# Patient Record
Sex: MALE | Race: WHITE | NOT HISPANIC OR LATINO | Employment: FULL TIME | URBAN - METROPOLITAN AREA
[De-identification: names, ages, dates, MRNs, and addresses within clinical notes are randomized per-mention and may not be internally consistent; named-entity substitution may affect disease eponyms.]

---

## 2017-12-24 ENCOUNTER — OFFICE VISIT (OUTPATIENT)
Dept: URGENT CARE | Facility: CLINIC | Age: 52
End: 2017-12-24
Payer: COMMERCIAL

## 2017-12-24 VITALS
SYSTOLIC BLOOD PRESSURE: 160 MMHG | HEART RATE: 96 BPM | WEIGHT: 200 LBS | TEMPERATURE: 98.5 F | HEIGHT: 72 IN | RESPIRATION RATE: 14 BRPM | BODY MASS INDEX: 27.09 KG/M2 | DIASTOLIC BLOOD PRESSURE: 80 MMHG | OXYGEN SATURATION: 97 %

## 2017-12-24 DIAGNOSIS — J40 BRONCHITIS: Primary | ICD-10-CM

## 2017-12-24 DIAGNOSIS — J06.9 URI WITH COUGH AND CONGESTION: ICD-10-CM

## 2017-12-24 DIAGNOSIS — R68.89 FLU-LIKE SYMPTOMS: ICD-10-CM

## 2017-12-24 LAB
FLUAV+FLUBV AG SPEC QL IA: NEGATIVE
INT CON NEG: NEGATIVE
INT CON POS: POSITIVE

## 2017-12-24 PROCEDURE — 87804 INFLUENZA ASSAY W/OPTIC: CPT | Performed by: PHYSICIAN ASSISTANT

## 2017-12-24 PROCEDURE — 99204 OFFICE O/P NEW MOD 45 MIN: CPT | Performed by: PHYSICIAN ASSISTANT

## 2017-12-24 RX ORDER — DOXYCYCLINE HYCLATE 100 MG
100 TABLET ORAL 2 TIMES DAILY
Qty: 14 TAB | Refills: 0 | Status: SHIPPED | OUTPATIENT
Start: 2017-12-24 | End: 2017-12-31

## 2017-12-24 RX ORDER — METHYLPREDNISOLONE 4 MG/1
TABLET ORAL
Qty: 21 TAB | Refills: 0 | Status: SHIPPED | OUTPATIENT
Start: 2017-12-24 | End: 2019-07-03

## 2017-12-24 RX ORDER — PROMETHAZINE HYDROCHLORIDE AND CODEINE PHOSPHATE 6.25; 1 MG/5ML; MG/5ML
5 SYRUP ORAL 4 TIMES DAILY PRN
Qty: 240 ML | Refills: 0 | Status: SHIPPED | OUTPATIENT
Start: 2017-12-24 | End: 2018-01-07

## 2017-12-24 NOTE — PROGRESS NOTES
Subjective:      Pt is a 52 y.o. male who presents with URI (uri symptoms  , some diarrhea x 3 days .)            HPI  PT presents to UC clinic today complaining of sore throat, fevers, chills, watery eyes, pressure in ears, cough, fatigue, runny nose. PT denies CP, SOB, NVD, abdominal pain, joint pain. PT states these symptoms began around 3 days ago. PT states the pain is a 6/10, aching in nature and worse at night.  Pt has not taken any RX medications for this condition. The pt's medication list, problem list, and allergies have been evaluated and reviewed during today's visit.      PMH:  Negative per pt.      PSH:  Past Surgical History:   Procedure Laterality Date   • AORTIC VALVE REPLACEMENT  12/17/2008    Performed by PETE VALIENTE at SURGERY [facility] Hx:  the patient's family history is not pertinent to their current complaint    Soc HX:  Social History     Social History   • Marital status: Single     Spouse name: N/A   • Number of children: N/A   • Years of education: N/A     Occupational History   • Not on file.     Social History Main Topics   • Smoking status: Never Smoker   • Smokeless tobacco: Never Used   • Alcohol use Yes      Comment: little bit of alcohol   • Drug use: No   • Sexual activity: Not on file     Other Topics Concern   • Not on file     Social History Narrative   • No narrative on file         Medications:    Current Outpatient Prescriptions:   •  Multiple Vitamins-Minerals (ZINC PO), Take  by mouth., Disp: , Rfl:   •  Chlorphen-Pseudoephed-APAP (THERAFLU FLU/COLD PO), Take  by mouth., Disp: , Rfl:   •  WARFARIN SODIUM 2.5 MG PO TABS, Take  by mouth every evening., Disp: , Rfl:   •  VICODIN 5-500 MG PO TABS, Take  by mouth every 6 hours as needed., Disp: , Rfl:   •  LOPRESSOR PO, Take 12.5 mg by mouth 2 Times a Day., Disp: , Rfl:   •  AMOXICILLIN 500 MG PO CAPS, Take  by mouth 3 times a day., Disp: , Rfl:   •  STOOL SOFTENER PO, Take  by mouth., Disp: , Rfl:        Allergies:  Patient has no known allergies.    ROS    Constitutional: Positive for  malaise/fatigue.   HENT: Positive for congestion and sore throat. Negative for ear pain.    Eyes: Negative for blurred vision, double vision and photophobia.   Respiratory: Positive for cough and sputum production. Negative for hemoptysis, shortness of breath and wheezing.    Cardiovascular: Negative for chest pain and palpitations.   Gastrointestinal: Negative for nausea, vomiting, abdominal pain, diarrhea and constipation.   Genitourinary: Negative for dysuria and flank pain.   Musculoskeletal: Negative for falls and myalgias.   Skin: Negative for itching and rash.   Neurological: Positive for headaches. Negative for dizziness and tingling.   Endo/Heme/Allergies: Does not bruise/bleed easily.   Psychiatric/Behavioral: Negative for depression. The patient is not nervous/anxious.         Objective:     /80   Pulse 96   Temp 36.9 °C (98.5 °F)   Resp 14   Ht 1.829 m (6')   Wt 90.7 kg (200 lb)   SpO2 97%   BMI 27.12 kg/m²      Physical Exam    Physical Exam   Constitutional: PT is oriented to person, place, and time. PT appears well-developed and well-nourished. No distress.   HENT:   Head: Normocephalic and atraumatic.   Right Ear: Hearing, tympanic membrane, external ear and ear canal normal.   Left Ear: Hearing, tympanic membrane, external ear and ear canal normal.   Nose: Mucosal edema, rhinorrhea and sinus tenderness present. Right sinus exhibits frontal sinus tenderness. Left sinus exhibits frontal sinus tenderness.   Mouth/Throat: Uvula is midline. Mucous membranes are pale. Posterior oropharyngeal edema and posterior oropharyngeal erythema present. No oropharyngeal exudate.   Eyes: Conjunctivae normal and EOM are normal. Pupils are equal, round, and reactive to light. Right eye exhibits no discharge. Left eye exhibits no discharge.   Neck: Normal range of motion. Neck supple. No thyromegaly present.    Cardiovascular: Normal rate, regular rhythm, normal heart sounds and intact distal pulses.  Exam reveals no gallop and no friction rub.    No murmur heard.  Pulmonary/Chest: Effort normal. No respiratory distress. PT has wheezes. PT has no rales. PT exhibits tenderness.   Abdominal: Soft. Bowel sounds are normal. PT exhibits no distension and no mass. There is no tenderness. There is no rebound and no guarding.   Musculoskeletal: Normal range of motion. PT exhibits no edema and no tenderness.   Lymphadenopathy:     PT has no cervical adenopathy.   Neurological: Pt is alert and oriented to person, place, and time. Pt has normal reflexes. No cranial nerve deficit.   Skin: Skin is warm and dry. No rash noted. No erythema.   Psychiatric: PT has a normal mood and affect. Pt behavior is normal. Judgment and thought content normal.          Assessment/Plan:     1. Bronchitis    2. URI with cough and congestion      2. Flu-like symptoms    POC flu-->NEG  Doxycycline  Codeine cough syrup  Medrol pack  Rest, fluids encouraged.  OTC decongestant for congestion/cough  AVS with medical info given.  Pt was in full understanding and agreement with the plan.  Follow-up as needed if symptoms worsen or fail to improve.

## 2017-12-24 NOTE — PATIENT INSTRUCTIONS
Acute Bronchitis  Bronchitis is inflammation of the airways that extend from the windpipe into the lungs (bronchi). The inflammation often causes mucus to develop. This leads to a cough, which is the most common symptom of bronchitis.   In acute bronchitis, the condition usually develops suddenly and goes away over time, usually in a couple weeks. Smoking, allergies, and asthma can make bronchitis worse. Repeated episodes of bronchitis may cause further lung problems.   CAUSES  Acute bronchitis is most often caused by the same virus that causes a cold. The virus can spread from person to person (contagious) through coughing, sneezing, and touching contaminated objects.  SIGNS AND SYMPTOMS   · Cough.    · Fever.    · Coughing up mucus.    · Body aches.    · Chest congestion.    · Chills.    · Shortness of breath.    · Sore throat.    DIAGNOSIS   Acute bronchitis is usually diagnosed through a physical exam. Your health care provider will also ask you questions about your medical history. Tests, such as chest X-rays, are sometimes done to rule out other conditions.   TREATMENT   Acute bronchitis usually goes away in a couple weeks. Oftentimes, no medical treatment is necessary. Medicines are sometimes given for relief of fever or cough. Antibiotic medicines are usually not needed but may be prescribed in certain situations. In some cases, an inhaler may be recommended to help reduce shortness of breath and control the cough. A cool mist vaporizer may also be used to help thin bronchial secretions and make it easier to clear the chest.   HOME CARE INSTRUCTIONS  · Get plenty of rest.    · Drink enough fluids to keep your urine clear or pale yellow (unless you have a medical condition that requires fluid restriction). Increasing fluids may help thin your respiratory secretions (sputum) and reduce chest congestion, and it will prevent dehydration.    · Take medicines only as directed by your health care provider.  · If  you were prescribed an antibiotic medicine, finish it all even if you start to feel better.  · Avoid smoking and secondhand smoke. Exposure to cigarette smoke or irritating chemicals will make bronchitis worse. If you are a smoker, consider using nicotine gum or skin patches to help control withdrawal symptoms. Quitting smoking will help your lungs heal faster.    · Reduce the chances of another bout of acute bronchitis by washing your hands frequently, avoiding people with cold symptoms, and trying not to touch your hands to your mouth, nose, or eyes.    · Keep all follow-up visits as directed by your health care provider.    SEEK MEDICAL CARE IF:  Your symptoms do not improve after 1 week of treatment.   SEEK IMMEDIATE MEDICAL CARE IF:  · You develop an increased fever or chills.    · You have chest pain.    · You have severe shortness of breath.  · You have bloody sputum.    · You develop dehydration.  · You faint or repeatedly feel like you are going to pass out.  · You develop repeated vomiting.  · You develop a severe headache.  MAKE SURE YOU:   · Understand these instructions.  · Will watch your condition.  · Will get help right away if you are not doing well or get worse.     This information is not intended to replace advice given to you by your health care provider. Make sure you discuss any questions you have with your health care provider.     Document Released: 01/25/2006 Document Revised: 01/08/2016 Document Reviewed: 06/10/2014  [employer] Interactive Patient Education ©2016 [employer]

## 2019-07-03 ENCOUNTER — OFFICE VISIT (OUTPATIENT)
Dept: URGENT CARE | Facility: CLINIC | Age: 54
End: 2019-07-03
Payer: COMMERCIAL

## 2019-07-03 VITALS
OXYGEN SATURATION: 98 % | DIASTOLIC BLOOD PRESSURE: 74 MMHG | RESPIRATION RATE: 15 BRPM | HEART RATE: 69 BPM | HEIGHT: 72 IN | SYSTOLIC BLOOD PRESSURE: 116 MMHG | BODY MASS INDEX: 27.09 KG/M2 | TEMPERATURE: 97.1 F | WEIGHT: 200 LBS

## 2019-07-03 DIAGNOSIS — L08.9 SKIN INFECTION: ICD-10-CM

## 2019-07-03 PROCEDURE — 99214 OFFICE O/P EST MOD 30 MIN: CPT | Performed by: FAMILY MEDICINE

## 2019-07-03 RX ORDER — DOXYCYCLINE HYCLATE 100 MG
100 TABLET ORAL EVERY 12 HOURS
Qty: 14 TAB | Refills: 0 | Status: SHIPPED | OUTPATIENT
Start: 2019-07-03 | End: 2019-07-10

## 2019-07-04 NOTE — PROGRESS NOTES
Subjective:      Kiran Raymond is a 54 y.o. male who presents with Hand Problem (hand infection from cut )      - This is a pleasant and non toxic appearing 54 y.o. male with c/o moving something yesterday and cut top of Rt hand on a piece of metal. Last td 3 yrs ago. Wounds looks red today to him           ALLERGIES:  Patient has no known allergies.     PMH:  History reviewed. No pertinent past medical history.     PSH:  Past Surgical History:   Procedure Laterality Date   • AORTIC VALVE REPLACEMENT  12/17/2008    Performed by PETE VALIENTE at [facility]       MEDS:    Current Outpatient Prescriptions:   •  doxycycline (VIBRAMYCIN) 100 MG Tab, Take 1 Tab by mouth every 12 hours for 7 days., Disp: 14 Tab, Rfl: 0  •  mupirocin (BACTROBAN) 2 % Ointment, Apply 1 Application to affected area(s) 2 times a day for 7 days., Disp: 30 g, Rfl: 1  •  Multiple Vitamins-Minerals (ZINC PO), Take  by mouth., Disp: , Rfl:   •  VICODIN 5-500 MG PO TABS, Take  by mouth every 6 hours as needed., Disp: , Rfl:   •  LOPRESSOR PO, Take 12.5 mg by mouth 2 Times a Day., Disp: , Rfl:   •  WARFARIN SODIUM 2.5 MG PO TABS, Take  by mouth every evening., Disp: , Rfl:     ** I have documented what I find to be significant in regards to past medical, social, family and surgical history  in my HPI or under PMH/PSH/FH review section, otherwise it is contributory **         HPI    Review of Systems   Skin:        Cut Rt hand   All other systems reviewed and are negative.         Objective:     /74   Pulse 69   Temp 36.2 °C (97.1 °F) (Temporal)   Resp 15   Ht 1.829 m (6')   Wt 90.7 kg (200 lb)   SpO2 98%   BMI 27.12 kg/m²      Physical Exam   Constitutional: He appears well-developed. No distress.   HENT:   Head: Normocephalic and atraumatic.   Cardiovascular: Regular rhythm.    No murmur heard.  Pulmonary/Chest: Effort normal. No respiratory distress.   Neurological: He is alert. He exhibits normal muscle tone.   Skin:  Skin is warm and dry.   Psychiatric: He has a normal mood and affect. Judgment normal.   Nursing note and vitals reviewed.    ~1-2cm non bleeding/healing wound dorsal Rt hand w/ slight erythema around wound margins. Full srom fingers           Assessment/Plan:         1. Skin infection  doxycycline (VIBRAMYCIN) 100 MG Tab    mupirocin (BACTROBAN) 2 % Ointment         Check INR in 3-4 days     Dx & d/c instructions discussed w/ patient and/or family members.     Follow up with PCP (or here if PCP unavailable) in 2-3 days if symptoms not improving, ER if feeling/getting worse.    Any realistic and/or common medication side effects that may have been given today(i.e. Rash, GI upset/constipation, sedation, elevation of BP or blood sugars) reviewed.     Patient left in stable condition

## 2019-07-04 NOTE — PROGRESS NOTES
Subjective:      Kiran Raymond is a 54 y.o. male who presents with Hand Problem (hand infection from cut )      - This is a pleasant and non toxic appearing 54 y.o. male with c/o           ALLERGIES:  Patient has no known allergies.     PMH:  History reviewed. No pertinent past medical history.     PSH:  Past Surgical History:   Procedure Laterality Date   • AORTIC VALVE REPLACEMENT  12/17/2008    Performed by PETE VALIENTE at [facility]       MEDS:    Current Outpatient Prescriptions:   •  Multiple Vitamins-Minerals (ZINC PO), Take  by mouth., Disp: , Rfl:   •  VICODIN 5-500 MG PO TABS, Take  by mouth every 6 hours as needed., Disp: , Rfl:   •  LOPRESSOR PO, Take 12.5 mg by mouth 2 Times a Day., Disp: , Rfl:   •  WARFARIN SODIUM 2.5 MG PO TABS, Take  by mouth every evening., Disp: , Rfl:     ** I have documented what I find to be significant in regards to past medical, social, family and surgical history  in my HPI or under PMH/PSH/FH review section, otherwise it is contributory **         HPI    Review of Systems   All other systems reviewed and are negative.         Objective:     /74   Pulse 69   Temp 36.2 °C (97.1 °F) (Temporal)   Resp 15   Ht 1.829 m (6')   Wt 90.7 kg (200 lb)   SpO2 98%   BMI 27.12 kg/m²      Physical Exam   Constitutional: He appears well-developed. No distress.   HENT:   Head: Normocephalic and atraumatic.   Mouth/Throat: Oropharynx is clear and moist.   Eyes: Conjunctivae are normal.   Neck: Neck supple.   Cardiovascular: Regular rhythm.    No murmur heard.  Pulmonary/Chest: Effort normal. No respiratory distress.   Neurological: He is alert. He exhibits normal muscle tone.   Skin: Skin is warm and dry.   Psychiatric: He has a normal mood and affect. Judgment normal.   Nursing note and vitals reviewed.              Assessment/Plan:         1. Skin infection               Dx & d/c instructions discussed w/ patient and/or family members.     Follow up with PCP  (or here if PCP unavailable) in 2-3 days if symptoms not improving, ER if feeling/getting worse.    Any realistic and/or common medication side effects that may have been given today(i.e. Rash, GI upset/constipation, sedation, elevation of BP or blood sugars) reviewed.     Patient left in stable condition

## 2021-12-15 ENCOUNTER — APPOINTMENT (RX ONLY)
Dept: URBAN - METROPOLITAN AREA CLINIC 6 | Facility: CLINIC | Age: 56
Setting detail: DERMATOLOGY
End: 2021-12-15

## 2021-12-15 DIAGNOSIS — L82.1 OTHER SEBORRHEIC KERATOSIS: ICD-10-CM

## 2021-12-15 DIAGNOSIS — D22 MELANOCYTIC NEVI: ICD-10-CM

## 2021-12-15 DIAGNOSIS — Z71.89 OTHER SPECIFIED COUNSELING: ICD-10-CM

## 2021-12-15 DIAGNOSIS — D18.0 HEMANGIOMA: ICD-10-CM

## 2021-12-15 DIAGNOSIS — L57.0 ACTINIC KERATOSIS: ICD-10-CM

## 2021-12-15 DIAGNOSIS — L85.3 XEROSIS CUTIS: ICD-10-CM

## 2021-12-15 PROBLEM — D22.39 MELANOCYTIC NEVI OF OTHER PARTS OF FACE: Status: ACTIVE | Noted: 2021-12-15

## 2021-12-15 PROBLEM — D18.01 HEMANGIOMA OF SKIN AND SUBCUTANEOUS TISSUE: Status: ACTIVE | Noted: 2021-12-15

## 2021-12-15 PROCEDURE — ? SUNSCREEN RECOMMENDATIONS

## 2021-12-15 PROCEDURE — ? COUNSELING

## 2021-12-15 PROCEDURE — ? LIQUID NITROGEN

## 2021-12-15 PROCEDURE — ? ADDITIONAL NOTES

## 2021-12-15 PROCEDURE — 99203 OFFICE O/P NEW LOW 30 MIN: CPT | Mod: 25

## 2021-12-15 PROCEDURE — 17003 DESTRUCT PREMALG LES 2-14: CPT

## 2021-12-15 PROCEDURE — 17000 DESTRUCT PREMALG LESION: CPT

## 2021-12-15 ASSESSMENT — LOCATION SIMPLE DESCRIPTION DERM
LOCATION SIMPLE: RIGHT FOREHEAD
LOCATION SIMPLE: LEFT FOREHEAD
LOCATION SIMPLE: RIGHT FOREARM
LOCATION SIMPLE: RIGHT UPPER ARM
LOCATION SIMPLE: LEFT UPPER ARM
LOCATION SIMPLE: LEFT FOREARM
LOCATION SIMPLE: LEFT CHEEK
LOCATION SIMPLE: CHIN
LOCATION SIMPLE: INFERIOR FOREHEAD

## 2021-12-15 ASSESSMENT — LOCATION DETAILED DESCRIPTION DERM
LOCATION DETAILED: RIGHT VENTRAL DISTAL FOREARM
LOCATION DETAILED: LEFT INFERIOR LATERAL MALAR CHEEK
LOCATION DETAILED: RIGHT PROXIMAL POSTERIOR UPPER ARM
LOCATION DETAILED: INFERIOR MID FOREHEAD
LOCATION DETAILED: LEFT PROXIMAL POSTERIOR UPPER ARM
LOCATION DETAILED: LEFT CHIN
LOCATION DETAILED: LEFT VENTRAL DISTAL FOREARM
LOCATION DETAILED: RIGHT INFERIOR LATERAL FOREHEAD
LOCATION DETAILED: LEFT INFERIOR FOREHEAD

## 2021-12-15 ASSESSMENT — LOCATION ZONE DERM
LOCATION ZONE: ARM
LOCATION ZONE: FACE

## 2021-12-15 NOTE — PROCEDURE: LIQUID NITROGEN
Render Note In Bullet Format When Appropriate: No
Post-Care Instructions: I reviewed with the patient in detail post-care instructions. Patient is to wear sunprotection, and avoid picking at any of the treated lesions. Pt may apply Vaseline to crusted or scabbing areas.
Detail Level: Detailed
Duration Of Freeze Thaw-Cycle (Seconds): 8
Show Aperture Variable?: Yes
Number Of Freeze-Thaw Cycles: 2 freeze-thaw cycles
Consent: The patient's consent was obtained including but not limited to risks of crusting, scabbing, blistering, scarring, darker or lighter pigmentary change, recurrence, incomplete removal and infection.

## 2021-12-15 NOTE — PROCEDURE: SUNSCREEN RECOMMENDATIONS
Detail Level: Detailed
Products Recommended: Elta MD UV Clear and Brush on Block
General Sunscreen Counseling: I recommended a broad spectrum sunscreen with a SPF of 30 or higher.  I explained that SPF 30 sunscreens block approximately 97 percent of the sun's harmful rays.  Sunscreens should be applied at least 15 minutes prior to expected sun exposure and then every 2 hours after that as long as sun exposure continues. If swimming or exercising sunscreen should be reapplied every 45 minutes to an hour after getting wet or sweating.  One ounce, or the equivalent of a shot glass full of sunscreen, is adequate to protect the skin not covered by a bathing suit. I also recommended a lip balm with a sunscreen as well. Sun protective clothing can be used in lieu of sunscreen but must be worn the entire time you are exposed to the sun's rays.

## 2021-12-15 NOTE — PROCEDURE: ADDITIONAL NOTES
Detail Level: Detailed
Additional Notes: Lesion of concern located above the right eyebrow is clinically consistent with an Actinic Keratosis.
Render Risk Assessment In Note?: no
Additional Notes: Recommended use of CeraVe Moisturizing Cream 1-2 times daily, with one application within 3-5 minutes after showering.

## 2022-02-17 ENCOUNTER — APPOINTMENT (RX ONLY)
Dept: URBAN - METROPOLITAN AREA CLINIC 6 | Facility: CLINIC | Age: 57
Setting detail: DERMATOLOGY
End: 2022-02-17

## 2022-02-17 DIAGNOSIS — L91.8 OTHER HYPERTROPHIC DISORDERS OF THE SKIN: ICD-10-CM

## 2022-02-17 DIAGNOSIS — L85.3 XEROSIS CUTIS: ICD-10-CM

## 2022-02-17 DIAGNOSIS — D18.0 HEMANGIOMA: ICD-10-CM

## 2022-02-17 DIAGNOSIS — L81.4 OTHER MELANIN HYPERPIGMENTATION: ICD-10-CM

## 2022-02-17 DIAGNOSIS — D22 MELANOCYTIC NEVI: ICD-10-CM

## 2022-02-17 DIAGNOSIS — L82.1 OTHER SEBORRHEIC KERATOSIS: ICD-10-CM

## 2022-02-17 DIAGNOSIS — L57.0 ACTINIC KERATOSIS: ICD-10-CM

## 2022-02-17 DIAGNOSIS — Z71.89 OTHER SPECIFIED COUNSELING: ICD-10-CM

## 2022-02-17 PROBLEM — D22.5 MELANOCYTIC NEVI OF TRUNK: Status: ACTIVE | Noted: 2022-02-17

## 2022-02-17 PROBLEM — D18.01 HEMANGIOMA OF SKIN AND SUBCUTANEOUS TISSUE: Status: ACTIVE | Noted: 2022-02-17

## 2022-02-17 PROCEDURE — ? ADDITIONAL NOTES

## 2022-02-17 PROCEDURE — ? PHOTO-DOCUMENTATION

## 2022-02-17 PROCEDURE — 17000 DESTRUCT PREMALG LESION: CPT

## 2022-02-17 PROCEDURE — 99213 OFFICE O/P EST LOW 20 MIN: CPT | Mod: 25

## 2022-02-17 PROCEDURE — ? COUNSELING

## 2022-02-17 PROCEDURE — ? LIQUID NITROGEN

## 2022-02-17 PROCEDURE — ? SUNSCREEN RECOMMENDATIONS

## 2022-02-17 PROCEDURE — 17003 DESTRUCT PREMALG LES 2-14: CPT

## 2022-02-17 ASSESSMENT — LOCATION DETAILED DESCRIPTION DERM
LOCATION DETAILED: LEFT PROXIMAL POSTERIOR UPPER ARM
LOCATION DETAILED: RIGHT SUPERIOR MEDIAL MIDBACK
LOCATION DETAILED: PERIUMBILICAL SKIN
LOCATION DETAILED: RIGHT ANTERIOR PROXIMAL THIGH
LOCATION DETAILED: RIGHT POSTERIOR SHOULDER
LOCATION DETAILED: LEFT MEDIAL INFERIOR CHEST
LOCATION DETAILED: LEFT PROXIMAL PRETIBIAL REGION
LOCATION DETAILED: RIGHT CENTRAL EYEBROW
LOCATION DETAILED: RIGHT INFERIOR MEDIAL UPPER BACK
LOCATION DETAILED: MID POSTERIOR NECK
LOCATION DETAILED: LEFT CENTRAL MALAR CHEEK
LOCATION DETAILED: LEFT POSTERIOR SHOULDER
LOCATION DETAILED: RIGHT DISTAL CALF
LOCATION DETAILED: EPIGASTRIC SKIN
LOCATION DETAILED: RIGHT PROXIMAL POSTERIOR UPPER ARM

## 2022-02-17 ASSESSMENT — LOCATION SIMPLE DESCRIPTION DERM
LOCATION SIMPLE: RIGHT LOWER BACK
LOCATION SIMPLE: LEFT UPPER ARM
LOCATION SIMPLE: RIGHT UPPER BACK
LOCATION SIMPLE: RIGHT THIGH
LOCATION SIMPLE: RIGHT UPPER ARM
LOCATION SIMPLE: POSTERIOR NECK
LOCATION SIMPLE: LEFT SHOULDER
LOCATION SIMPLE: ABDOMEN
LOCATION SIMPLE: CHEST
LOCATION SIMPLE: RIGHT EYEBROW
LOCATION SIMPLE: RIGHT CALF
LOCATION SIMPLE: LEFT PRETIBIAL REGION
LOCATION SIMPLE: RIGHT SHOULDER
LOCATION SIMPLE: LEFT CHEEK

## 2022-02-17 ASSESSMENT — LOCATION ZONE DERM
LOCATION ZONE: FACE
LOCATION ZONE: TRUNK
LOCATION ZONE: ARM
LOCATION ZONE: NECK
LOCATION ZONE: LEG

## 2022-02-17 NOTE — PROCEDURE: COUNSELING
Detail Level: Generalized
Detail Level: Zone
Sunscreen Recommendations: Recommended broad spectrum inorganic or physical sunscreens primarily containing zinc oxide or titanium dioxide.
Detail Level: Detailed

## 2022-02-17 NOTE — PROCEDURE: ADDITIONAL NOTES
Render Risk Assessment In Note?: no
Detail Level: Detailed
Additional Notes: Recommended use of CeraVe Moisturizing Cream 1-2 times daily, with one application within 3-5 minutes after showering.

## 2022-02-17 NOTE — PROCEDURE: LIQUID NITROGEN
Detail Level: Detailed
Show Applicator Variable?: Yes
Duration Of Freeze Thaw-Cycle (Seconds): 8
Post-Care Instructions: I reviewed with the patient in detail post-care instructions. Patient is to wear sunprotection, and avoid picking at any of the treated lesions. Pt may apply Vaseline to crusted or scabbing areas.
Render Note In Bullet Format When Appropriate: No
Number Of Freeze-Thaw Cycles: 2 freeze-thaw cycles
Consent: The patient's consent was obtained including but not limited to risks of crusting, scabbing, blistering, scarring, darker or lighter pigmentary change, recurrence, incomplete removal and infection.

## 2022-02-17 NOTE — PROCEDURE: PHOTO-DOCUMENTATION
Photo Preface (Leave Blank If You Do Not Want): Photographs were obtained today
Details (Free Text): Left cheek. Appears most consistent with an irritated nevus, no concerning features clinically or under dermoscopy. Will continue to monitor and will recheck at f/u visit.
Detail Level: Detailed

## 2023-05-12 ENCOUNTER — HOSPITAL ENCOUNTER (OUTPATIENT)
Dept: LAB | Facility: MEDICAL CENTER | Age: 58
End: 2023-05-12
Attending: FAMILY MEDICINE
Payer: COMMERCIAL

## 2023-05-12 LAB
ALBUMIN SERPL BCP-MCNC: 4.4 G/DL (ref 3.2–4.9)
ALBUMIN/GLOB SERPL: 1.6 G/DL
ALP SERPL-CCNC: 69 U/L (ref 30–99)
ALT SERPL-CCNC: 16 U/L (ref 2–50)
ANION GAP SERPL CALC-SCNC: 12 MMOL/L (ref 7–16)
AST SERPL-CCNC: 25 U/L (ref 12–45)
BASOPHILS # BLD AUTO: 0.8 % (ref 0–1.8)
BASOPHILS # BLD: 0.04 K/UL (ref 0–0.12)
BILIRUB SERPL-MCNC: 1 MG/DL (ref 0.1–1.5)
BUN SERPL-MCNC: 16 MG/DL (ref 8–22)
CALCIUM ALBUM COR SERPL-MCNC: 8.4 MG/DL (ref 8.5–10.5)
CALCIUM SERPL-MCNC: 8.7 MG/DL (ref 8.5–10.5)
CHLORIDE SERPL-SCNC: 105 MMOL/L (ref 96–112)
CHOLEST SERPL-MCNC: 184 MG/DL (ref 100–199)
CO2 SERPL-SCNC: 23 MMOL/L (ref 20–33)
CREAT SERPL-MCNC: 0.82 MG/DL (ref 0.5–1.4)
EOSINOPHIL # BLD AUTO: 0.1 K/UL (ref 0–0.51)
EOSINOPHIL NFR BLD: 1.9 % (ref 0–6.9)
ERYTHROCYTE [DISTWIDTH] IN BLOOD BY AUTOMATED COUNT: 45 FL (ref 35.9–50)
FASTING STATUS PATIENT QL REPORTED: NORMAL
GFR SERPLBLD CREATININE-BSD FMLA CKD-EPI: 101 ML/MIN/1.73 M 2
GLOBULIN SER CALC-MCNC: 2.8 G/DL (ref 1.9–3.5)
GLUCOSE SERPL-MCNC: 97 MG/DL (ref 65–99)
HCT VFR BLD AUTO: 47.5 % (ref 42–52)
HDLC SERPL-MCNC: 51 MG/DL
HGB BLD-MCNC: 16.7 G/DL (ref 14–18)
IMM GRANULOCYTES # BLD AUTO: 0.01 K/UL (ref 0–0.11)
IMM GRANULOCYTES NFR BLD AUTO: 0.2 % (ref 0–0.9)
INR PPP: 2.88 (ref 0.87–1.13)
LDLC SERPL CALC-MCNC: 107 MG/DL
LYMPHOCYTES # BLD AUTO: 0.95 K/UL (ref 1–4.8)
LYMPHOCYTES NFR BLD: 18.1 % (ref 22–41)
MCH RBC QN AUTO: 31.9 PG (ref 27–33)
MCHC RBC AUTO-ENTMCNC: 35.2 G/DL (ref 33.7–35.3)
MCV RBC AUTO: 90.8 FL (ref 81.4–97.8)
MONOCYTES # BLD AUTO: 0.32 K/UL (ref 0–0.85)
MONOCYTES NFR BLD AUTO: 6.1 % (ref 0–13.4)
NEUTROPHILS # BLD AUTO: 3.82 K/UL (ref 1.82–7.42)
NEUTROPHILS NFR BLD: 72.9 % (ref 44–72)
NRBC # BLD AUTO: 0 K/UL
NRBC BLD-RTO: 0 /100 WBC
PLATELET # BLD AUTO: 167 K/UL (ref 164–446)
PMV BLD AUTO: 12.1 FL (ref 9–12.9)
POTASSIUM SERPL-SCNC: 4.2 MMOL/L (ref 3.6–5.5)
PROT SERPL-MCNC: 7.2 G/DL (ref 6–8.2)
PROTHROMBIN TIME: 29.2 SEC (ref 12–14.6)
PSA SERPL-MCNC: 1.17 NG/ML (ref 0–4)
RBC # BLD AUTO: 5.23 M/UL (ref 4.7–6.1)
SODIUM SERPL-SCNC: 140 MMOL/L (ref 135–145)
TRIGL SERPL-MCNC: 129 MG/DL (ref 0–149)
WBC # BLD AUTO: 5.2 K/UL (ref 4.8–10.8)

## 2023-05-12 PROCEDURE — 85610 PROTHROMBIN TIME: CPT

## 2023-05-12 PROCEDURE — 84403 ASSAY OF TOTAL TESTOSTERONE: CPT

## 2023-05-12 PROCEDURE — 80053 COMPREHEN METABOLIC PANEL: CPT

## 2023-05-12 PROCEDURE — 85025 COMPLETE CBC W/AUTO DIFF WBC: CPT

## 2023-05-12 PROCEDURE — 80061 LIPID PANEL: CPT

## 2023-05-12 PROCEDURE — 84270 ASSAY OF SEX HORMONE GLOBUL: CPT

## 2023-05-12 PROCEDURE — 36415 COLL VENOUS BLD VENIPUNCTURE: CPT

## 2023-05-12 PROCEDURE — 84153 ASSAY OF PSA TOTAL: CPT

## 2023-05-12 PROCEDURE — 84402 ASSAY OF FREE TESTOSTERONE: CPT

## 2023-05-15 LAB
SHBG SERPL-SCNC: 34 NMOL/L (ref 19–76)
TESTOST FREE MFR SERPL: 1.8 % (ref 1.6–2.9)
TESTOST FREE SERPL-MCNC: 61 PG/ML (ref 47–244)
TESTOST SERPL-MCNC: 341 NG/DL (ref 300–890)

## 2023-12-14 ENCOUNTER — APPOINTMENT (RX ONLY)
Dept: URBAN - METROPOLITAN AREA CLINIC 6 | Facility: CLINIC | Age: 58
Setting detail: DERMATOLOGY
End: 2023-12-14

## 2023-12-14 DIAGNOSIS — L57.0 ACTINIC KERATOSIS: ICD-10-CM

## 2023-12-14 DIAGNOSIS — D18.0 HEMANGIOMA: ICD-10-CM

## 2023-12-14 DIAGNOSIS — L81.8 OTHER SPECIFIED DISORDERS OF PIGMENTATION: ICD-10-CM

## 2023-12-14 DIAGNOSIS — Z71.89 OTHER SPECIFIED COUNSELING: ICD-10-CM

## 2023-12-14 DIAGNOSIS — L81.4 OTHER MELANIN HYPERPIGMENTATION: ICD-10-CM

## 2023-12-14 DIAGNOSIS — L82.1 OTHER SEBORRHEIC KERATOSIS: ICD-10-CM

## 2023-12-14 DIAGNOSIS — D22 MELANOCYTIC NEVI: ICD-10-CM

## 2023-12-14 PROBLEM — D18.01 HEMANGIOMA OF SKIN AND SUBCUTANEOUS TISSUE: Status: ACTIVE | Noted: 2023-12-14

## 2023-12-14 PROBLEM — D22.5 MELANOCYTIC NEVI OF TRUNK: Status: ACTIVE | Noted: 2023-12-14

## 2023-12-14 PROCEDURE — ? SUNSCREEN RECOMMENDATIONS

## 2023-12-14 PROCEDURE — ? COUNSELING

## 2023-12-14 PROCEDURE — 17000 DESTRUCT PREMALG LESION: CPT

## 2023-12-14 PROCEDURE — 17003 DESTRUCT PREMALG LES 2-14: CPT

## 2023-12-14 PROCEDURE — 99213 OFFICE O/P EST LOW 20 MIN: CPT | Mod: 25

## 2023-12-14 PROCEDURE — ? PHOTO-DOCUMENTATION

## 2023-12-14 PROCEDURE — ? LIQUID NITROGEN

## 2023-12-14 ASSESSMENT — LOCATION DETAILED DESCRIPTION DERM
LOCATION DETAILED: LEFT MID-UPPER BACK
LOCATION DETAILED: RIGHT CENTRAL EYEBROW
LOCATION DETAILED: LEFT INFERIOR UPPER BACK
LOCATION DETAILED: RIGHT SUPERIOR PREAURICULAR CHEEK
LOCATION DETAILED: LEFT RIB CAGE
LOCATION DETAILED: LEFT INFERIOR CENTRAL MALAR CHEEK
LOCATION DETAILED: LEFT ANTERIOR PROXIMAL THIGH

## 2023-12-14 ASSESSMENT — LOCATION ZONE DERM
LOCATION ZONE: LEG
LOCATION ZONE: TRUNK
LOCATION ZONE: FACE

## 2023-12-14 ASSESSMENT — LOCATION SIMPLE DESCRIPTION DERM
LOCATION SIMPLE: RIGHT EYEBROW
LOCATION SIMPLE: RIGHT CHEEK
LOCATION SIMPLE: LEFT UPPER BACK
LOCATION SIMPLE: LEFT THIGH
LOCATION SIMPLE: ABDOMEN
LOCATION SIMPLE: LEFT CHEEK

## 2023-12-14 NOTE — PROCEDURE: LIQUID NITROGEN
Show Aperture Variable?: Yes
Render Note In Bullet Format When Appropriate: No
Number Of Freeze-Thaw Cycles: 2 freeze-thaw cycles
Post-Care Instructions: I reviewed with the patient in detail post-care instructions. Patient is to wear sunprotection, and avoid picking at any of the treated lesions. Pt may apply Vaseline to crusted or scabbing areas.
Duration Of Freeze Thaw-Cycle (Seconds): 8
Consent: The patient's consent was obtained including but not limited to risks of crusting, scabbing, blistering, scarring, darker or lighter pigmentary change, recurrence, incomplete removal and infection.
Detail Level: Detailed

## 2023-12-14 NOTE — PROCEDURE: PHOTO-DOCUMENTATION
Photo Preface (Leave Blank If You Do Not Want): Photographs were obtained today
Details (Free Text): Captured updated clinical photo of lesion on left cheek and right chest. Nevus involving cheek appears either significantly irritated due to shaving, or it’s changing (concerning features). Patient reports long-standing duration and denies any changes, defers biopsy today. Will have low threshold for biopsy.  Will recheck at 3 month follow up visit.
Detail Level: Detailed

## 2024-03-14 ENCOUNTER — APPOINTMENT (RX ONLY)
Dept: URBAN - METROPOLITAN AREA CLINIC 6 | Facility: CLINIC | Age: 59
Setting detail: DERMATOLOGY
End: 2024-03-14

## 2024-03-14 DIAGNOSIS — D22 MELANOCYTIC NEVI: ICD-10-CM | Status: STABLE

## 2024-03-14 PROBLEM — D22.39 MELANOCYTIC NEVI OF OTHER PARTS OF FACE: Status: ACTIVE | Noted: 2024-03-14

## 2024-03-14 PROBLEM — D22.5 MELANOCYTIC NEVI OF TRUNK: Status: ACTIVE | Noted: 2024-03-14

## 2024-03-14 PROCEDURE — 99212 OFFICE O/P EST SF 10 MIN: CPT

## 2024-03-14 PROCEDURE — ? ADDITIONAL NOTES

## 2024-03-14 PROCEDURE — ? COUNSELING

## 2024-03-14 ASSESSMENT — LOCATION ZONE DERM
LOCATION ZONE: TRUNK
LOCATION ZONE: FACE

## 2024-03-14 ASSESSMENT — LOCATION DETAILED DESCRIPTION DERM
LOCATION DETAILED: LEFT SUPERIOR CENTRAL BUCCAL CHEEK
LOCATION DETAILED: RIGHT CLAVICULAR SKIN

## 2024-03-14 ASSESSMENT — LOCATION SIMPLE DESCRIPTION DERM
LOCATION SIMPLE: RIGHT CLAVICULAR SKIN
LOCATION SIMPLE: LEFT CHEEK

## 2024-03-14 NOTE — PROCEDURE: ADDITIONAL NOTES
Render Risk Assessment In Note?: no
Additional Notes: No changes noted when compared to prior clinical photos (12/2023). Will continue to monitor and recheck at f/u visit. Previously noted “fuzziness” on ELM and irritation/scale has improved with proper shaving habits.
Detail Level: Detailed

## 2024-04-26 ENCOUNTER — HOSPITAL ENCOUNTER (OUTPATIENT)
Dept: LAB | Facility: MEDICAL CENTER | Age: 59
End: 2024-04-26
Attending: FAMILY MEDICINE
Payer: COMMERCIAL

## 2024-04-26 ENCOUNTER — HOSPITAL ENCOUNTER (OUTPATIENT)
Dept: LAB | Facility: MEDICAL CENTER | Age: 59
End: 2024-04-26
Payer: COMMERCIAL

## 2024-04-26 LAB
ALBUMIN SERPL BCP-MCNC: 4.5 G/DL (ref 3.2–4.9)
ALBUMIN/GLOB SERPL: 1.7 G/DL
ALP SERPL-CCNC: 51 U/L (ref 30–99)
ALT SERPL-CCNC: 14 U/L (ref 2–50)
ANION GAP SERPL CALC-SCNC: 10 MMOL/L (ref 7–16)
AST SERPL-CCNC: 28 U/L (ref 12–45)
BASOPHILS # BLD AUTO: 0.5 % (ref 0–1.8)
BASOPHILS # BLD: 0.03 K/UL (ref 0–0.12)
BILIRUB SERPL-MCNC: 1 MG/DL (ref 0.1–1.5)
BUN SERPL-MCNC: 13 MG/DL (ref 8–22)
CALCIUM ALBUM COR SERPL-MCNC: 8.3 MG/DL (ref 8.5–10.5)
CALCIUM SERPL-MCNC: 8.7 MG/DL (ref 8.5–10.5)
CHLORIDE SERPL-SCNC: 102 MMOL/L (ref 96–112)
CHOLEST SERPL-MCNC: 187 MG/DL (ref 100–199)
CO2 SERPL-SCNC: 26 MMOL/L (ref 20–33)
CREAT SERPL-MCNC: 0.83 MG/DL (ref 0.5–1.4)
EOSINOPHIL # BLD AUTO: 0.04 K/UL (ref 0–0.51)
EOSINOPHIL NFR BLD: 0.7 % (ref 0–6.9)
ERYTHROCYTE [DISTWIDTH] IN BLOOD BY AUTOMATED COUNT: 43.8 FL (ref 35.9–50)
FASTING STATUS PATIENT QL REPORTED: NORMAL
GFR SERPLBLD CREATININE-BSD FMLA CKD-EPI: 100 ML/MIN/1.73 M 2
GLOBULIN SER CALC-MCNC: 2.6 G/DL (ref 1.9–3.5)
GLUCOSE SERPL-MCNC: 101 MG/DL (ref 65–99)
HCT VFR BLD AUTO: 49.1 % (ref 42–52)
HDLC SERPL-MCNC: 61 MG/DL
HGB BLD-MCNC: 17.3 G/DL (ref 14–18)
IMM GRANULOCYTES # BLD AUTO: 0.02 K/UL (ref 0–0.11)
IMM GRANULOCYTES NFR BLD AUTO: 0.3 % (ref 0–0.9)
LDLC SERPL CALC-MCNC: 111 MG/DL
LYMPHOCYTES # BLD AUTO: 1.22 K/UL (ref 1–4.8)
LYMPHOCYTES NFR BLD: 20.8 % (ref 22–41)
MCH RBC QN AUTO: 32.5 PG (ref 27–33)
MCHC RBC AUTO-ENTMCNC: 35.2 G/DL (ref 32.3–36.5)
MCV RBC AUTO: 92.3 FL (ref 81.4–97.8)
MONOCYTES # BLD AUTO: 0.31 K/UL (ref 0–0.85)
MONOCYTES NFR BLD AUTO: 5.3 % (ref 0–13.4)
NEUTROPHILS # BLD AUTO: 4.25 K/UL (ref 1.82–7.42)
NEUTROPHILS NFR BLD: 72.4 % (ref 44–72)
NRBC # BLD AUTO: 0 K/UL
NRBC BLD-RTO: 0 /100 WBC (ref 0–0.2)
PLATELET # BLD AUTO: 182 K/UL (ref 164–446)
PMV BLD AUTO: 11.7 FL (ref 9–12.9)
POTASSIUM SERPL-SCNC: 4.4 MMOL/L (ref 3.6–5.5)
PROT SERPL-MCNC: 7.1 G/DL (ref 6–8.2)
PSA SERPL-MCNC: 1.33 NG/ML (ref 0–4)
RBC # BLD AUTO: 5.32 M/UL (ref 4.7–6.1)
SODIUM SERPL-SCNC: 138 MMOL/L (ref 135–145)
TRIGL SERPL-MCNC: 76 MG/DL (ref 0–149)
TSH SERPL DL<=0.005 MIU/L-ACNC: 1.49 UIU/ML (ref 0.38–5.33)
WBC # BLD AUTO: 5.9 K/UL (ref 4.8–10.8)

## 2024-04-26 PROCEDURE — 84443 ASSAY THYROID STIM HORMONE: CPT

## 2024-04-26 PROCEDURE — 85025 COMPLETE CBC W/AUTO DIFF WBC: CPT

## 2024-04-26 PROCEDURE — 80061 LIPID PANEL: CPT

## 2024-04-26 PROCEDURE — 84153 ASSAY OF PSA TOTAL: CPT

## 2024-04-26 PROCEDURE — 80053 COMPREHEN METABOLIC PANEL: CPT

## 2024-04-26 PROCEDURE — 36415 COLL VENOUS BLD VENIPUNCTURE: CPT

## 2024-05-10 PROBLEM — S83.249A MEDIAL MENISCUS TEAR: Status: ACTIVE | Noted: 2024-05-10

## 2024-07-02 ENCOUNTER — TELEPHONE (OUTPATIENT)
Dept: VASCULAR SURGERY | Facility: MEDICAL CENTER | Age: 59
End: 2024-07-02
Payer: COMMERCIAL

## 2024-08-07 ENCOUNTER — TELEPHONE (OUTPATIENT)
Dept: CARDIOLOGY | Facility: MEDICAL CENTER | Age: 59
End: 2024-08-07
Payer: COMMERCIAL

## 2024-08-07 NOTE — TELEPHONE ENCOUNTER
Called patient in regards to records for NP appointment with Dr. MCCORMACK. To review most recent lab results, ekg, any cardiac testing or ,if patient has been treated by a cardiologist. No answer, left voicemail to call back

## 2024-08-15 ENCOUNTER — HOSPITAL ENCOUNTER (OUTPATIENT)
Dept: CARDIOLOGY | Facility: MEDICAL CENTER | Age: 59
End: 2024-08-15
Attending: FAMILY MEDICINE
Payer: COMMERCIAL

## 2024-08-15 DIAGNOSIS — Z95.2 PRESENCE OF PROSTHETIC HEART VALVE: ICD-10-CM

## 2024-08-15 LAB
LV EJECT FRACT MOD 2C 99903: 59.11
LV EJECT FRACT MOD 4C 99902: 60.71
LV EJECT FRACT MOD BP 99901: 61

## 2024-08-15 PROCEDURE — 93306 TTE W/DOPPLER COMPLETE: CPT | Mod: 26 | Performed by: INTERNAL MEDICINE

## 2024-08-15 PROCEDURE — 93306 TTE W/DOPPLER COMPLETE: CPT

## 2024-08-22 ENCOUNTER — OFFICE VISIT (OUTPATIENT)
Dept: CARDIOLOGY | Facility: MEDICAL CENTER | Age: 59
End: 2024-08-22
Attending: INTERNAL MEDICINE
Payer: COMMERCIAL

## 2024-08-22 VITALS
DIASTOLIC BLOOD PRESSURE: 76 MMHG | HEART RATE: 65 BPM | HEIGHT: 72 IN | OXYGEN SATURATION: 97 % | WEIGHT: 198 LBS | BODY MASS INDEX: 26.82 KG/M2 | SYSTOLIC BLOOD PRESSURE: 148 MMHG | RESPIRATION RATE: 16 BRPM

## 2024-08-22 DIAGNOSIS — E78.5 DYSLIPIDEMIA: ICD-10-CM

## 2024-08-22 DIAGNOSIS — I10 ESSENTIAL HYPERTENSION, BENIGN: ICD-10-CM

## 2024-08-22 DIAGNOSIS — Z00.00 ENCOUNTER FOR MEDICAL EXAMINATION TO ESTABLISH CARE: ICD-10-CM

## 2024-08-22 DIAGNOSIS — Z95.2 H/O MECHANICAL AORTIC VALVE REPLACEMENT: ICD-10-CM

## 2024-08-22 DIAGNOSIS — Z98.890 H/O ASCENDING AORTA REPAIR: ICD-10-CM

## 2024-08-22 DIAGNOSIS — Z79.01 CHRONIC ANTICOAGULATION: ICD-10-CM

## 2024-08-22 LAB — EKG IMPRESSION: NORMAL

## 2024-08-22 PROCEDURE — 99205 OFFICE O/P NEW HI 60 MIN: CPT | Performed by: INTERNAL MEDICINE

## 2024-08-22 PROCEDURE — 93005 ELECTROCARDIOGRAM TRACING: CPT | Performed by: INTERNAL MEDICINE

## 2024-08-22 PROCEDURE — 99212 OFFICE O/P EST SF 10 MIN: CPT | Performed by: INTERNAL MEDICINE

## 2024-08-22 PROCEDURE — 3078F DIAST BP <80 MM HG: CPT | Performed by: INTERNAL MEDICINE

## 2024-08-22 PROCEDURE — 93010 ELECTROCARDIOGRAM REPORT: CPT | Performed by: INTERNAL MEDICINE

## 2024-08-22 PROCEDURE — 3077F SYST BP >= 140 MM HG: CPT | Performed by: INTERNAL MEDICINE

## 2024-08-22 RX ORDER — LOSARTAN POTASSIUM 25 MG/1
25 TABLET ORAL DAILY
Qty: 100 TABLET | Refills: 3 | Status: SHIPPED | OUTPATIENT
Start: 2024-08-22

## 2024-08-22 RX ORDER — ROSUVASTATIN CALCIUM 5 MG/1
5 TABLET, COATED ORAL EVERY EVENING
Qty: 100 TABLET | Refills: 3 | Status: SHIPPED | OUTPATIENT
Start: 2024-08-22

## 2024-08-22 ASSESSMENT — ENCOUNTER SYMPTOMS
NEUROLOGICAL NEGATIVE: 1
CONSTITUTIONAL NEGATIVE: 1
BLURRED VISION: 0
EYES NEGATIVE: 1
SHORTNESS OF BREATH: 0
BRUISES/BLEEDS EASILY: 0
ABDOMINAL PAIN: 0
GASTROINTESTINAL NEGATIVE: 1
DEPRESSION: 0
FEVER: 0
PALPITATIONS: 0
WEIGHT LOSS: 0
CARDIOVASCULAR NEGATIVE: 1
RESPIRATORY NEGATIVE: 1
NAUSEA: 0
COUGH: 0
MUSCULOSKELETAL NEGATIVE: 1
CHILLS: 0
MYALGIAS: 0
DOUBLE VISION: 0
CLAUDICATION: 0
DIZZINESS: 0
HEADACHES: 0
VOMITING: 0
WEAKNESS: 0
FOCAL WEAKNESS: 0
NERVOUS/ANXIOUS: 0
PSYCHIATRIC NEGATIVE: 1

## 2024-08-22 ASSESSMENT — FIBROSIS 4 INDEX: FIB4 SCORE: 2.51

## 2024-08-22 NOTE — PROGRESS NOTES
Chief Complaint   Patient presents with    New Patient       Subjective     Kiran Raymond is a 59 y.o. male who presents today for new patient establishment for mechanical aortic valve replacement.    Mr. Ryamond is a 59 year old male with mechanical aortic valve replacement with aortic root replacement (25-mm St. Devyn valved conduit and coronary artery reimplantation by Verona Mejias 12/17/08), dyslipidemia. He lived in [place] and move back to [place], [place]. He is clinically doing well, from cardiac standpoint. He denies fatigue, chest pain, shortness of breath, palpitations, lower extremity edema, dizziness or syncope. He keeps active walking. 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    Past Medical History:   Diagnosis Date    Hyperlipidemia     Valvular heart disease      Past Surgical History:   Procedure Laterality Date    PB KNEE SCOPE,MED/LAT MENISECTOMY Left 6/11/2024    Procedure: LEFT KNEE ARTHROSCOPY, LEFT PARTIAL MEDIAL MENISCECTOMY, REPAIRS AS NEEDED;  Surgeon: Stanley Lyon M.D.;  Location: [facility];  Service: Orthopedics    AORTIC VALVE REPLACEMENT  12/17/2008    Performed by VERONA VALIENTE at [facility] ORS     History reviewed. No pertinent family history.  Social History     Socioeconomic History    Marital status: Single     Spouse name: Not on file    Number of children: Not on file    Years of education: Not on file    Highest education level: Not on file   Occupational History    Not on file   Tobacco Use    Smoking status: Never    Smokeless tobacco: Never   Substance and Sexual Activity    Alcohol use: Yes     Comment: little bit of alcohol    Drug use: No    Sexual activity: Not on file   Other Topics Concern    Not on file   Social History Narrative    Not on file     Social Determinants of Health     Financial Resource Strain: Not on file   Food Insecurity: Not on file   Transportation Needs: Not on file   Physical  Activity: Not on file   Stress: Not on file   Social Connections: Not on file   Intimate Partner Violence: Not on file   Housing Stability: Not on file     No Known Allergies    (Medications reviewed.)  Outpatient Encounter Medications as of 8/22/2024   Medication Sig Dispense Refill    aspirin 81 MG EC tablet Take 81 mg by mouth every day.      cetirizine (ZYRTEC) 10 MG Tab Take 10 mg by mouth every day.      warfarin (COUMADIN) 5 MG Tab Take 5 mg by mouth every day.      Multiple Vitamin (MULTI-VITAMIN) Tab Take 1 Tablet by mouth every day.      [DISCONTINUED] enoxaparin (LOVENOX) 100 MG/ML Solution Prefilled Syringe inj inject 95 milligram subcutaneously twice a day as directed for 3 days for [facility]ERY [facility]       No facility-administered encounter medications on file as of 8/22/2024.     Review of Systems   Constitutional: Negative.  Negative for chills, fever, malaise/fatigue and weight loss.   HENT: Negative.  Negative for hearing loss.    Eyes: Negative.  Negative for blurred vision and double vision.   Respiratory: Negative.  Negative for cough and shortness of breath.    Cardiovascular: Negative.  Negative for chest pain, palpitations, claudication and leg swelling.   Gastrointestinal: Negative.  Negative for abdominal pain, nausea and vomiting.   Genitourinary: Negative.  Negative for dysuria and urgency.   Musculoskeletal: Negative.  Negative for joint pain and myalgias.   Skin:  Positive for rash. Negative for itching.   Neurological: Negative.  Negative for dizziness, focal weakness, weakness and headaches.   Endo/Heme/Allergies: Negative.  Does not bruise/bleed easily.   Psychiatric/Behavioral: Negative.  Negative for depression. The patient is not nervous/anxious.               Objective     BP (!) 148/76 (BP Location: Left arm, Patient Position: Sitting, BP Cuff Size: Adult)   Pulse 65   Resp 16   Ht 1.829 m (6')   Wt 89.8 kg (198 lb)   SpO2 97%   BMI 26.85 kg/m²     Physical  Exam  Constitutional:       Appearance: Normal appearance. He is well-developed and normal weight.   HENT:      Head: Normocephalic and atraumatic.   Neck:      Vascular: No JVD.   Cardiovascular:      Rate and Rhythm: Normal rate and regular rhythm.      Heart sounds: Normal heart sounds.   Pulmonary:      Effort: Pulmonary effort is normal.      Breath sounds: Normal breath sounds.   Abdominal:      General: Bowel sounds are normal.      Palpations: Abdomen is soft.      Comments: No hepatosplenomegaly.   Musculoskeletal:         General: Normal range of motion.   Lymphadenopathy:      Cervical: No cervical adenopathy.   Skin:     General: Skin is warm and dry.   Neurological:      Mental Status: He is alert and oriented to person, place, and time.     Normal mechanical click.       CARDIAC STUDIES/PROCEDURES:    ECHOCARDIOGRAM CONCLUSIONS (08/15/24)  Normal left ventricular chamber size.  The ejection fraction is measured to be 61% by Rodriguez's biplane.  Known mechanical aortic valve that is functioning normally with normal   transvalvular gradients.  Normal inferior vena cava size and inspiratory collapse.  (study result reviewed)     EKG was ordered for establishment of care, performed on (08/22/24) was reviewed: EKG, personally interpreted shows sinus rhythm .     Laboratory results of (04/26/24) were reviewed. Cholesterol profile of 187/76/61/111 mg/dL noted.    Assessment & Plan     1. Encounter for medical examination to establish care  EKG      2. H/O mechanical aortic valve replacement        3. H/O ascending aorta repair        4. Chronic anticoagulation        5. Dyslipidemia            Medical Decision Making: Today's Assessment/Status/Plan:        History of mechanical aortic valve replacement with aortic root replacement (25-mm St. Devyn valved conduit and coronary artery reimplantation by Verona Mejias 12/17/08): He is a 59 year old male with aortic valve replacement and root repair. He is  clinically doing well from valvular standpoint. We will repeat an echocardiogram in one year. We will refer to [facility] for surveillance. Warfarin is managed by primary care physician.   Hypertension: Blood pressure has been high. We will start losartan, continue with losartan and reassess the blood pressure.   Hyperlipidemia: Currently not well controlled on strict diet and exercise therapy. We will start statin therapy with rosuvastatin 5 mg QD. We will repeat labs including fasting lipid profile in 3 months.    Chewing tobacco use: Chewing tobacco cessation recommended.  Greater than 45 minutes of time was spent to review all above information; of which more than 50% of the time was face to face reviewing thee patient's medical issues, medication evaluation, study result review, lab results review,      We will follow up in 3 months.    CC King Parada

## 2024-08-27 ENCOUNTER — TELEPHONE (OUTPATIENT)
Dept: HEALTH INFORMATION MANAGEMENT | Facility: OTHER | Age: 59
End: 2024-08-27
Payer: COMMERCIAL

## 2024-11-25 ENCOUNTER — TELEPHONE (OUTPATIENT)
Dept: CARDIOLOGY | Facility: MEDICAL CENTER | Age: 59
End: 2024-11-25
Payer: COMMERCIAL

## 2024-11-25 NOTE — TELEPHONE ENCOUNTER
Noted.     Called and discussed with Pt. He will update JI on symptoms assoc with statin. Discussed possible lipid clinic referral. Pt will complete lab work before appt.

## 2024-11-25 NOTE — TELEPHONE ENCOUNTER
Called pt in regards to lab work that was ordered at previous OV. Patient states that they will get their labs done before their appt. Pt has follow up appointment scheduled with EASTON on 12/5/24.      Also pt wanted to let EASTON know that he had stop taking Rosuvastatin due to having a lot of symptoms like muscle pain, not being steady, and etc. Pt feels a lot better without it he stated.

## 2024-11-26 ENCOUNTER — TELEPHONE (OUTPATIENT)
Dept: CARDIOLOGY | Facility: MEDICAL CENTER | Age: 59
End: 2024-11-26
Payer: COMMERCIAL

## 2024-12-03 ENCOUNTER — HOSPITAL ENCOUNTER (OUTPATIENT)
Dept: LAB | Facility: MEDICAL CENTER | Age: 59
End: 2024-12-03
Attending: INTERNAL MEDICINE
Payer: COMMERCIAL

## 2024-12-03 DIAGNOSIS — E78.5 DYSLIPIDEMIA: ICD-10-CM

## 2024-12-03 PROCEDURE — 80061 LIPID PANEL: CPT

## 2024-12-03 PROCEDURE — 80053 COMPREHEN METABOLIC PANEL: CPT

## 2024-12-03 PROCEDURE — 36415 COLL VENOUS BLD VENIPUNCTURE: CPT

## 2024-12-04 LAB
ALBUMIN SERPL BCP-MCNC: 4.3 G/DL (ref 3.2–4.9)
ALBUMIN/GLOB SERPL: 1.7 G/DL
ALP SERPL-CCNC: 60 U/L (ref 30–99)
ALT SERPL-CCNC: 19 U/L (ref 2–50)
ANION GAP SERPL CALC-SCNC: 10 MMOL/L (ref 7–16)
AST SERPL-CCNC: 33 U/L (ref 12–45)
BILIRUB SERPL-MCNC: 1 MG/DL (ref 0.1–1.5)
BUN SERPL-MCNC: 14 MG/DL (ref 8–22)
CALCIUM ALBUM COR SERPL-MCNC: 9.1 MG/DL (ref 8.5–10.5)
CALCIUM SERPL-MCNC: 9.3 MG/DL (ref 8.5–10.5)
CHLORIDE SERPL-SCNC: 104 MMOL/L (ref 96–112)
CHOLEST SERPL-MCNC: 180 MG/DL (ref 100–199)
CO2 SERPL-SCNC: 25 MMOL/L (ref 20–33)
CREAT SERPL-MCNC: 0.98 MG/DL (ref 0.5–1.4)
FASTING STATUS PATIENT QL REPORTED: NORMAL
GFR SERPLBLD CREATININE-BSD FMLA CKD-EPI: 87 ML/MIN/1.73 M 2
GLOBULIN SER CALC-MCNC: 2.6 G/DL (ref 1.9–3.5)
GLUCOSE SERPL-MCNC: 109 MG/DL (ref 65–99)
HDLC SERPL-MCNC: 61 MG/DL
LDLC SERPL CALC-MCNC: 103 MG/DL
POTASSIUM SERPL-SCNC: 4.9 MMOL/L (ref 3.6–5.5)
PROT SERPL-MCNC: 6.9 G/DL (ref 6–8.2)
SODIUM SERPL-SCNC: 139 MMOL/L (ref 135–145)
TRIGL SERPL-MCNC: 81 MG/DL (ref 0–149)

## 2024-12-05 ENCOUNTER — OFFICE VISIT (OUTPATIENT)
Dept: CARDIOLOGY | Facility: MEDICAL CENTER | Age: 59
End: 2024-12-05
Attending: INTERNAL MEDICINE
Payer: COMMERCIAL

## 2024-12-05 VITALS
HEART RATE: 74 BPM | OXYGEN SATURATION: 98 % | HEIGHT: 72 IN | SYSTOLIC BLOOD PRESSURE: 122 MMHG | WEIGHT: 207 LBS | RESPIRATION RATE: 16 BRPM | DIASTOLIC BLOOD PRESSURE: 74 MMHG | BODY MASS INDEX: 28.04 KG/M2

## 2024-12-05 DIAGNOSIS — E78.5 DYSLIPIDEMIA: ICD-10-CM

## 2024-12-05 DIAGNOSIS — Z79.01 CHRONIC ANTICOAGULATION: ICD-10-CM

## 2024-12-05 DIAGNOSIS — I10 ESSENTIAL HYPERTENSION, BENIGN: ICD-10-CM

## 2024-12-05 DIAGNOSIS — Z95.2 H/O MECHANICAL AORTIC VALVE REPLACEMENT: ICD-10-CM

## 2024-12-05 PROCEDURE — 3078F DIAST BP <80 MM HG: CPT | Performed by: INTERNAL MEDICINE

## 2024-12-05 PROCEDURE — 3074F SYST BP LT 130 MM HG: CPT | Performed by: INTERNAL MEDICINE

## 2024-12-05 PROCEDURE — 99212 OFFICE O/P EST SF 10 MIN: CPT | Performed by: INTERNAL MEDICINE

## 2024-12-05 PROCEDURE — 99214 OFFICE O/P EST MOD 30 MIN: CPT | Performed by: INTERNAL MEDICINE

## 2024-12-05 RX ORDER — LOSARTAN POTASSIUM 25 MG/1
25 TABLET ORAL DAILY
Qty: 100 TABLET | Refills: 3 | Status: SHIPPED | OUTPATIENT
Start: 2024-12-05

## 2024-12-05 RX ORDER — ATORVASTATIN CALCIUM 10 MG/1
10 TABLET, FILM COATED ORAL NIGHTLY
Qty: 30 TABLET | Refills: 11 | Status: SHIPPED | OUTPATIENT
Start: 2024-12-05

## 2024-12-05 ASSESSMENT — ENCOUNTER SYMPTOMS
VOMITING: 0
PSYCHIATRIC NEGATIVE: 1
DEPRESSION: 0
SHORTNESS OF BREATH: 0
CHILLS: 0
CONSTITUTIONAL NEGATIVE: 1
MYALGIAS: 0
HEADACHES: 0
FOCAL WEAKNESS: 0
COUGH: 0
ABDOMINAL PAIN: 0
DIZZINESS: 0
NAUSEA: 0
RESPIRATORY NEGATIVE: 1
DOUBLE VISION: 0
WEAKNESS: 0
CLAUDICATION: 0
MUSCULOSKELETAL NEGATIVE: 1
NERVOUS/ANXIOUS: 0
BRUISES/BLEEDS EASILY: 0
GASTROINTESTINAL NEGATIVE: 1
NEUROLOGICAL NEGATIVE: 1
BLURRED VISION: 0
WEIGHT LOSS: 0
PALPITATIONS: 0
CARDIOVASCULAR NEGATIVE: 1
FEVER: 0
EYES NEGATIVE: 1

## 2024-12-05 ASSESSMENT — FIBROSIS 4 INDEX: FIB4 SCORE: 2.54

## 2024-12-05 NOTE — PROGRESS NOTES
Chief Complaint   Patient presents with    Hypertension       Subjective     Kiran Raymond is a 59 y.o. male who presents today for follow up of mechanical aortic valve replacement and root repair, hypertension and hyperlipidemia.    Since the patient's last visit on 08/22/24, he has been doing well clinically from cardiac standpoint. He denies fatigue, chest pain, shortness of breath, palpitations, lower extremity edema, dizziness or syncope. He keeps active walking.    Past Medical History:   Diagnosis Date    Hyperlipidemia     Valvular heart disease      Past Surgical History:   Procedure Laterality Date    PB KNEE SCOPE,MED/LAT MENISECTOMY Left 6/11/2024    Procedure: LEFT KNEE ARTHROSCOPY, LEFT PARTIAL MEDIAL MENISCECTOMY, REPAIRS AS NEEDED;  Surgeon: Stanley Lyon M.D.;  Location: [facility];  Service: Orthopedics    AORTIC VALVE REPLACEMENT  12/17/2008    Performed by PETE VALIENTE at SURGERY [facility] ORS     Family History   Problem Relation Age of Onset    Heart Disease Neg Hx     Heart Attack Neg Hx      Social History     Socioeconomic History    Marital status: Single     Spouse name: Not on file    Number of children: Not on file    Years of education: Not on file    Highest education level: Not on file   Occupational History    Not on file   Tobacco Use    Smoking status: Never    Smokeless tobacco: Current   Vaping Use    Vaping status: Never Used   Substance and Sexual Activity    Alcohol use: Yes     Comment: little bit of alcohol    Drug use: No    Sexual activity: Not on file   Other Topics Concern    Not on file   Social History Narrative    Not on file     Social Drivers of Health     Financial Resource Strain: Not on file   Food Insecurity: Not on file   Transportation Needs: Not on file   Physical Activity: Not on file   Stress: Not on file   Social Connections: Not on file   Intimate Partner Violence: Not on file   Housing Stability: Not on file      No Known Allergies  Outpatient Encounter Medications as of 12/5/2024   Medication Sig Dispense Refill    losartan (COZAAR) 25 MG Tab Take 1 Tablet by mouth every day. 100 Tablet 3    aspirin 81 MG EC tablet Take 81 mg by mouth every day.      cetirizine (ZYRTEC) 10 MG Tab Take 10 mg by mouth every day.      warfarin (COUMADIN) 5 MG Tab Take 5 mg by mouth every day.      Multiple Vitamin (MULTI-VITAMIN) Tab Take 1 Tablet by mouth every day.      [DISCONTINUED] rosuvastatin (CRESTOR) 5 MG Tab Take 1 Tablet by mouth every evening. 100 Tablet 3     No facility-administered encounter medications on file as of 12/5/2024.     Review of Systems   Constitutional: Negative.  Negative for chills, fever, malaise/fatigue and weight loss.   HENT: Negative.  Negative for hearing loss.    Eyes: Negative.  Negative for blurred vision and double vision.   Respiratory: Negative.  Negative for cough and shortness of breath.    Cardiovascular: Negative.  Negative for chest pain, palpitations, claudication and leg swelling.   Gastrointestinal: Negative.  Negative for abdominal pain, nausea and vomiting.   Genitourinary: Negative.  Negative for dysuria and urgency.   Musculoskeletal: Negative.  Negative for joint pain and myalgias.   Skin: Negative.  Negative for itching and rash.   Neurological: Negative.  Negative for dizziness, focal weakness, weakness and headaches.   Endo/Heme/Allergies: Negative.  Does not bruise/bleed easily.   Psychiatric/Behavioral: Negative.  Negative for depression. The patient is not nervous/anxious.               Objective     /74 (BP Location: Left arm, Patient Position: Sitting, BP Cuff Size: Adult)   Pulse 74   Resp 16   Ht 1.829 m (6')   Wt 93.9 kg (207 lb)   SpO2 98%   BMI 28.07 kg/m²     Physical Exam  Constitutional:       Appearance: Normal appearance. He is well-developed and normal weight.   HENT:      Head: Normocephalic and atraumatic.   Neck:      Vascular: No JVD.    Cardiovascular:      Rate and Rhythm: Normal rate and regular rhythm.      Heart sounds: Normal heart sounds.   Pulmonary:      Effort: Pulmonary effort is normal.      Breath sounds: Normal breath sounds.   Abdominal:      General: Bowel sounds are normal.      Palpations: Abdomen is soft.      Comments: No hepatosplenomegaly.   Musculoskeletal:         General: Normal range of motion.   Lymphadenopathy:      Cervical: No cervical adenopathy.   Skin:     General: Skin is warm and dry.   Neurological:      Mental Status: He is alert and oriented to person, place, and time.            CARDIAC STUDIES/PROCEDURES:     ECHOCARDIOGRAM CONCLUSIONS (08/15/24)  Normal left ventricular chamber size.  The ejection fraction is measured to be 61% by Rodriguez's biplane.  Known mechanical aortic valve that is functioning normally with normal   transvalvular gradients.  Normal inferior vena cava size and inspiratory collapse.  Normal aortic root for body surface area. The ascending aorta diameter is 3.1cm.    EKG performed on (08/22/24) EKG shows sinus rhythm .      Laboratory results of (12/03/24) were reviewed. Cholesterol profile of 180/81/61/103 mg/dL noted.  Laboratory results of (04/26/24) were reviewed. Cholesterol profile of 187/76/61/111 mg/dL noted.    Assessment & Plan     1. H/O mechanical aortic valve replacement        2. Chronic anticoagulation        3. Essential hypertension, benign        4. Dyslipidemia            Medical Decision Making: Today's Assessment/Status/Plan:        History of mechanical aortic valve replacement with aortic root replacement (25-mm St. Devyn valved conduit and coronary artery reimplantation by Verona Mejias 12/17/08): He is a 59 year old male with mechanical aortic valve replacement and root repair, hypertension and hyperlipidemia. He is clinically doing well from valvular standpoint. We will repeat an echocardiogram in one year.   Hypertension: Currently, the average blood  pressure is well controlled. We will continue with losartan.  Hyperlipidemia: Most recent labs demonstrates high cholesterol levels and he could not tolerate rosuvastatin. We will start atorvastatin.  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     We will follow up in 6 months.    CC King Parada

## 2024-12-10 ENCOUNTER — APPOINTMENT (OUTPATIENT)
Dept: URBAN - METROPOLITAN AREA CLINIC 6 | Facility: CLINIC | Age: 59
Setting detail: DERMATOLOGY
End: 2024-12-10

## 2024-12-10 DIAGNOSIS — D22 MELANOCYTIC NEVI: ICD-10-CM

## 2024-12-10 DIAGNOSIS — L57.0 ACTINIC KERATOSIS: ICD-10-CM

## 2024-12-10 DIAGNOSIS — D18.0 HEMANGIOMA: ICD-10-CM

## 2024-12-10 DIAGNOSIS — L81.8 OTHER SPECIFIED DISORDERS OF PIGMENTATION: ICD-10-CM

## 2024-12-10 DIAGNOSIS — Z71.89 OTHER SPECIFIED COUNSELING: ICD-10-CM

## 2024-12-10 DIAGNOSIS — L82.1 OTHER SEBORRHEIC KERATOSIS: ICD-10-CM

## 2024-12-10 DIAGNOSIS — L81.4 OTHER MELANIN HYPERPIGMENTATION: ICD-10-CM

## 2024-12-10 DIAGNOSIS — D22 MELANOCYTIC NEVI: ICD-10-CM | Status: STABLE

## 2024-12-10 PROBLEM — D18.01 HEMANGIOMA OF SKIN AND SUBCUTANEOUS TISSUE: Status: ACTIVE | Noted: 2024-12-10

## 2024-12-10 PROBLEM — D22.5 MELANOCYTIC NEVI OF TRUNK: Status: ACTIVE | Noted: 2024-12-10

## 2024-12-10 PROBLEM — D22.9 MELANOCYTIC NEVI, UNSPECIFIED: Status: ACTIVE | Noted: 2024-12-10

## 2024-12-10 PROBLEM — D22.39 MELANOCYTIC NEVI OF OTHER PARTS OF FACE: Status: ACTIVE | Noted: 2024-12-10

## 2024-12-10 PROCEDURE — ? DEFER

## 2024-12-10 PROCEDURE — ? COUNSELING

## 2024-12-10 PROCEDURE — 99213 OFFICE O/P EST LOW 20 MIN: CPT

## 2024-12-10 PROCEDURE — ? PHOTO-DOCUMENTATION

## 2024-12-10 PROCEDURE — ? SUNSCREEN RECOMMENDATIONS

## 2024-12-10 ASSESSMENT — LOCATION ZONE DERM
LOCATION ZONE: FACE
LOCATION ZONE: FEET
LOCATION ZONE: TRUNK
LOCATION ZONE: LEG

## 2024-12-10 ASSESSMENT — LOCATION SIMPLE DESCRIPTION DERM
LOCATION SIMPLE: ABDOMEN
LOCATION SIMPLE: LEFT UPPER BACK
LOCATION SIMPLE: CHEST
LOCATION SIMPLE: LEFT CHEEK
LOCATION SIMPLE: LEFT THIGH
LOCATION SIMPLE: RIGHT PLANTAR SURFACE

## 2024-12-10 ASSESSMENT — LOCATION DETAILED DESCRIPTION DERM
LOCATION DETAILED: LEFT INFERIOR UPPER BACK
LOCATION DETAILED: RIGHT ARCH
LOCATION DETAILED: LEFT INFERIOR CENTRAL MALAR CHEEK
LOCATION DETAILED: RIGHT MEDIAL INFERIOR CHEST
LOCATION DETAILED: LEFT ANTERIOR PROXIMAL THIGH
LOCATION DETAILED: LEFT RIB CAGE

## 2024-12-10 NOTE — PROCEDURE: PHOTO-DOCUMENTATION
Photo Preface (Leave Blank If You Do Not Want): Photographs
Details (Free Text): Nevus on left cheek and chest remains stable when compared to prior clinical photos, no concerning changes. Will continue to monitor at patients next skin exam.
Detail Level: Detailed

## 2024-12-12 ENCOUNTER — OFFICE VISIT (OUTPATIENT)
Dept: CARDIOLOGY | Facility: MEDICAL CENTER | Age: 59
End: 2024-12-12
Attending: INTERNAL MEDICINE
Payer: COMMERCIAL

## 2024-12-12 VITALS
WEIGHT: 205 LBS | DIASTOLIC BLOOD PRESSURE: 80 MMHG | BODY MASS INDEX: 27.77 KG/M2 | HEIGHT: 72 IN | SYSTOLIC BLOOD PRESSURE: 155 MMHG | HEART RATE: 76 BPM

## 2024-12-12 DIAGNOSIS — Z95.2 H/O MECHANICAL AORTIC VALVE REPLACEMENT: ICD-10-CM

## 2024-12-12 DIAGNOSIS — Z98.890 H/O ASCENDING AORTA REPAIR: ICD-10-CM

## 2024-12-12 DIAGNOSIS — I10 ESSENTIAL HYPERTENSION, BENIGN: ICD-10-CM

## 2024-12-12 DIAGNOSIS — E78.5 DYSLIPIDEMIA: ICD-10-CM

## 2024-12-12 DIAGNOSIS — Z79.01 CHRONIC ANTICOAGULATION: ICD-10-CM

## 2024-12-12 DIAGNOSIS — R09.89 PULSE IRREGULARITY: ICD-10-CM

## 2024-12-12 PROCEDURE — 3079F DIAST BP 80-89 MM HG: CPT | Performed by: INTERNAL MEDICINE

## 2024-12-12 PROCEDURE — G2211 COMPLEX E/M VISIT ADD ON: HCPCS | Performed by: INTERNAL MEDICINE

## 2024-12-12 PROCEDURE — 99214 OFFICE O/P EST MOD 30 MIN: CPT | Performed by: INTERNAL MEDICINE

## 2024-12-12 PROCEDURE — 3077F SYST BP >= 140 MM HG: CPT | Performed by: INTERNAL MEDICINE

## 2024-12-12 PROCEDURE — 99212 OFFICE O/P EST SF 10 MIN: CPT

## 2024-12-12 PROCEDURE — 93005 ELECTROCARDIOGRAM TRACING: CPT | Mod: TC | Performed by: INTERNAL MEDICINE

## 2024-12-12 ASSESSMENT — FIBROSIS 4 INDEX: FIB4 SCORE: 2.54

## 2024-12-12 NOTE — PROGRESS NOTES
VASCULAR MEDICINE CLINIC - INITIAL VISIT  12/12/24     Kiran Raymond is a 59 y.o. male  who has been referred for vascular medicine evaluation and management   Referring provider: Renato Joe M.D.     Subjective      HPI:   Referred for e/m of ascending aneurysm repair and mechanical SAVR from 2008  Patient was told that perhaps he had a connective tissue disease but he has never really had musculoskeletal issues.  He has no family history of aneurysm.  He is never had genetic testing.  He is done quite well since surgery.  No chest pain or shortness of breath.  No TIA or stroke symptoms.  He had muscle pain with rosuvastatin and his INR increased so he was recently changed to atorvastatin.  He has not started it yet  He was started on losartan at a low dose about 6 months ago when his blood pressures are going up.  He does not check very often at home but says when he does it is less than 130/80  He remains on aspirin and warfarin  No bleeding  He has a home INR monitor with [employer] and then is given instructions for dosing by his PCP.  He seems comfortable with this arrangement  Denies tobacco use or other stimulants  He has 2 children 1 sibling.  One of his children's had a echocardiogram that he believes was normal.  The other relatives have not undergone screening      Patient Active Problem List    Diagnosis Date Noted    H/O mechanical aortic valve replacement 08/22/2024    Chronic anticoagulation 08/22/2024    Dyslipidemia 08/22/2024    H/O ascending aorta repair 08/22/2024    Essential hypertension, benign 08/22/2024    Medial meniscus tear 05/10/2024      Past Surgical History:   Procedure Laterality Date    PB KNEE SCOPE,MED/LAT MENISECTOMY Left 6/11/2024    Procedure: LEFT KNEE ARTHROSCOPY, LEFT PARTIAL MEDIAL MENISCECTOMY, REPAIRS AS NEEDED;  Surgeon: Stanley Lyon M.D.;  Location: [facility];  Service: Orthopedics    AORTIC VALVE REPLACEMENT  12/17/2008     Performed by PETE VALIENTE at SURGERY [facility] ORS      Family History   Problem Relation Age of Onset    Heart Disease Neg Hx     Heart Attack Neg Hx       Current Outpatient Medications on File Prior to Visit   Medication Sig Dispense Refill    atorvastatin (LIPITOR) 10 MG Tab Take 1 Tablet by mouth every evening. 30 Tablet 11    losartan (COZAAR) 25 MG Tab Take 1 Tablet by mouth every day. 100 Tablet 3    aspirin 81 MG EC tablet Take 81 mg by mouth every day.      cetirizine (ZYRTEC) 10 MG Tab Take 10 mg by mouth every day.      warfarin (COUMADIN) 5 MG Tab Take 5 mg by mouth every day.      Multiple Vitamin (MULTI-VITAMIN) Tab Take 1 Tablet by mouth every day.       No current facility-administered medications on file prior to visit.      ALLERGIES  Patient has no known allergies.    Social History     Tobacco Use    Smoking status: Never    Smokeless tobacco: Current   Vaping Use    Vaping status: Never Used   Substance Use Topics    Alcohol use: Yes     Comment: little bit of alcohol    Drug use: No     DIET AND EXERCISE:  Weight Change: stable  Diet: relatively healthy  Exercise: limited      Objective    Vitals:    12/12/24 1433 12/12/24 1437   BP: (!) 150/91 (!) 155/80   BP Location: Left arm Left arm   Patient Position: Sitting Sitting   BP Cuff Size: Adult Adult   Pulse: 62 76   Weight: 93 kg (205 lb)    Height: 1.829 m (6')       BP Readings from Last 4 Encounters:   12/12/24 (!) 155/80   12/05/24 122/74   08/22/24 (!) 148/76   06/11/24 135/71      Body mass index is 27.8 kg/m².   Wt Readings from Last 4 Encounters:   12/12/24 93 kg (205 lb)   12/05/24 93.9 kg (207 lb)   08/22/24 89.8 kg (198 lb)   06/11/24 90.2 kg (198 lb 15.4 oz)      Physical Exam  Vitals reviewed.   Constitutional:       General: He is not in acute distress.     Appearance: He is not diaphoretic.   HENT:      Head: Normocephalic and atraumatic.   Eyes:      General: No scleral icterus.     Conjunctiva/sclera: Conjunctivae  "normal.   Neck:      Vascular: No carotid bruit.   Cardiovascular:      Rate and Rhythm: Normal rate and regular rhythm.      Heart sounds: Murmur heard.   Pulmonary:      Effort: Pulmonary effort is normal. No respiratory distress.      Breath sounds: Normal breath sounds. No wheezing or rales.   Musculoskeletal:      Right lower leg: No edema.      Left lower leg: No edema.   Skin:     Coloration: Skin is not pale.   Neurological:      General: No focal deficit present.      Mental Status: He is alert and oriented to person, place, and time.      Cranial Nerves: No cranial nerve deficit.      Coordination: Coordination normal.      Gait: Gait is intact. Gait normal.   Psychiatric:         Mood and Affect: Mood and affect normal.         Behavior: Behavior normal.          DATA REVIEW    Lab Results   Component Value Date/Time    CHOLSTRLTOT 180 12/03/2024 08:32 AM     (H) 12/03/2024 08:32 AM    HDL 61 12/03/2024 08:32 AM    TRIGLYCERIDE 81 12/03/2024 08:32 AM       Lab Results   Component Value Date/Time     (H) 12/03/2024 08:32 AM     (H) 04/26/2024 08:34 AM     (H) 05/12/2023 09:51 AM     No results found for: \"LIPOPROTA\"  No results found for: \"APOB\"  Lab Results   Component Value Date/Time    SODIUM 139 12/03/2024 08:32 AM    POTASSIUM 4.9 12/03/2024 08:32 AM    CHLORIDE 104 12/03/2024 08:32 AM    CO2 25 12/03/2024 08:32 AM    GLUCOSE 109 (H) 12/03/2024 08:32 AM    BUN 14 12/03/2024 08:32 AM    CREATININE 0.98 12/03/2024 08:32 AM    CREATININE 1.0 01/02/2009 07:50 PM     Lab Results   Component Value Date/Time    ALKPHOSPHAT 60 12/03/2024 08:32 AM    ASTSGOT 33 12/03/2024 08:32 AM    ALTSGPT 19 12/03/2024 08:32 AM    TBILIRUBIN 1.0 12/03/2024 08:32 AM       No results found for: \"HBA1C\"    No results found for: \"MICROALBCALC\", \"MALBCRT\", \"MALBEXCR\", \"SGZTIR20\", \"MICROALBUR\", \"MICRALB\", \"UMICROALBUM\", \"MICROALBTIM\"      Cardiovascular Imaging:    Echo aug 2024  Normal left " "ventricular chamber size.  The ejection fraction is measured to be 61 % by Rodriguez's biplane.  Known mechanical aortic valve that is functioning normally with normal   transvalvular gradients.  Normal inferior vena cava size and inspiratory collapse.    Previous Cardiovascular Procedures of Note:    2008 - mechanical SAVR and ascending repair        Medical Decision Making:  Today's Assessment / Status / Plan:     1. H/O mechanical aortic valve replacement        2. H/O ascending aorta repair  CT-CTA CHEST WITH & W/O-POST PROCESS      3. Dyslipidemia  APOLIPOPROTEIN B    LIPOPROTEIN A    CREATINE KINASE    TSH    Lipid Profile      4. Chronic anticoagulation  EKG      5. Pulse irregularity  EKG      6. 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  No family members affected.  Valve was tricuspid at surgery.  We discussed the importance of family screening and surveillance.  Valve appears to be functioning well on recent echocardiogram  Plan:  -Recommended echocardiogram every 3 to 5 years for first-degree relatives pending results of genetic testing  -Send for [facility] aortopathy panel -MA to collect in process sample today  -Check CTA chest to rule out aneurysmal change distal to the repair  -Repeat echocardiogram at the discretion of cardiology  -Medical management and lifestyle modification as per below    LIPID MANAGEMENT  Qualifies for Statin Therapy Based on Most Recent ACC/AHA Guidelines: yes,   10-yr ASCVD risk score: The 10-year ASCVD risk score (Aura EVANS, et al., 2019) is: 12%, 7.5 - <20% \"intermediate risk\"   Major ASCVD events: None    High-risk conditions: None   Risk-enhancers: None known  Currently on Statin: no  Had " significant myalgias and difficulty maintaining INR on rosuvastatin  Has prescription for atorvastatin has not yet started  Tx goals:   LDL less than 70  Apolipoprotein B less than 70  At goal?  No  Plan:   -Agree with starting with atorvastatin 10 mg a day.  Stop and call if has significant myalgias  -Add vitamin D OTC to help with tolerability  -Recheck fasting lipid panel, apolipoprotein B, and one-time lipoprotein a prior to next visit    BLOOD PRESSURE MANAGEMENT  BP Goal: ACC/AHA goal <130/80  Home BP at goal: Yes  Office BP at goal:  no  24h ABPM:  not ordered to date   Contributing factors: Mild LVH on echo  Modest alcohol use   Plan:   Monitoring:   -Restart careful home blood pressure monitoring and bring in a log  -Check GFR, electrolytes, urine for albumin  Medications:  -Continue losartan 25 mg a day for now but would have low threshold to increase dose    GLYCEMIC MANAGEMENT Normal  -Check fasting glucose    ANTITHROMBOTIC THERAPY:   Discussed risk and benefits of anticoagulation/antithrombotic therapy in this setting.  Although the combination of warfarin and aspirin can increase bleeding risk he is overall at low bleeding risk and I do think this combination is warranted to prevent stroke  -Continue warfarin with INR goal 2-3  -Continue low-dose aspirin  -Report any bleeding  -He can continue to use [employer] and follow-up with his PCP for dosing since that seems to be working well for him, but I did offer our services in our anticoagulation clinic if he or his PCP is interested in the future    LIFESTYLE INTERVENTIONS:    Tobacco Use:  reports that he has never smoked. He uses smokeless tobacco.   - continued complete avoidance of all tobacco products     Physical Activity: Encouraged more regular exercise    Weight Management and/or Nutrition: Limit alcohol to no more than 2 drinks a day.  Continue overall heart healthy eating plan and maintenance of weight    Studies to Be Obtained: CTA chest  Labs  to Be Obtained: As above    Follow up in: 4 months    Michael J Bloch, M.D.   [facility]  [facility]  [phone]    Cc:   ESTUARDO Joe

## 2024-12-13 LAB — EKG IMPRESSION: NORMAL

## 2024-12-13 PROCEDURE — 93010 ELECTROCARDIOGRAM REPORT: CPT | Performed by: INTERNAL MEDICINE

## 2025-04-07 ENCOUNTER — TELEPHONE (OUTPATIENT)
Dept: VASCULAR LAB | Facility: MEDICAL CENTER | Age: 60
End: 2025-04-07
Payer: COMMERCIAL

## 2025-04-07 NOTE — TELEPHONE ENCOUNTER
Established patient  Chart prep for upcoming appointment.    Any pending/incomplete orders from last visit? Yes Labs and Imaging CTA  Was patient called and reminded to complete pending orders? Yes  Were any records requested?  No    Referral up to date? Yes  Referral attached to appointment (renewals and New patients only)? N/A (established with up-to-date referral)  Virtual appointment? No    Violet Mae Med Ass't  [facility]  Ph. [phone]  Fx. [phone]

## 2025-04-11 ENCOUNTER — HOSPITAL ENCOUNTER (OUTPATIENT)
Dept: LAB | Facility: MEDICAL CENTER | Age: 60
End: 2025-04-11
Attending: FAMILY MEDICINE
Payer: COMMERCIAL

## 2025-04-11 ENCOUNTER — HOSPITAL ENCOUNTER (OUTPATIENT)
Dept: LAB | Facility: MEDICAL CENTER | Age: 60
End: 2025-04-11
Attending: INTERNAL MEDICINE
Payer: COMMERCIAL

## 2025-04-11 DIAGNOSIS — E78.5 DYSLIPIDEMIA: ICD-10-CM

## 2025-04-11 DIAGNOSIS — I10 ESSENTIAL HYPERTENSION, BENIGN: ICD-10-CM

## 2025-04-11 LAB
ALBUMIN SERPL BCP-MCNC: 4.3 G/DL (ref 3.2–4.9)
ALBUMIN/GLOB SERPL: 1.7 G/DL
ALP SERPL-CCNC: 59 U/L (ref 30–99)
ALT SERPL-CCNC: 20 U/L (ref 2–50)
ANION GAP SERPL CALC-SCNC: 9 MMOL/L (ref 7–16)
AST SERPL-CCNC: 30 U/L (ref 12–45)
BASOPHILS # BLD AUTO: 1.1 % (ref 0–1.8)
BASOPHILS # BLD: 0.05 K/UL (ref 0–0.12)
BILIRUB SERPL-MCNC: 0.8 MG/DL (ref 0.1–1.5)
BUN SERPL-MCNC: 15 MG/DL (ref 8–22)
CALCIUM ALBUM COR SERPL-MCNC: 8.9 MG/DL (ref 8.5–10.5)
CALCIUM SERPL-MCNC: 9.1 MG/DL (ref 8.5–10.5)
CHLORIDE SERPL-SCNC: 106 MMOL/L (ref 96–112)
CHOLEST SERPL-MCNC: 176 MG/DL (ref 100–199)
CK SERPL-CCNC: 76 U/L (ref 0–154)
CO2 SERPL-SCNC: 25 MMOL/L (ref 20–33)
CREAT SERPL-MCNC: 0.97 MG/DL (ref 0.5–1.4)
CREAT UR-MCNC: 127 MG/DL
EOSINOPHIL # BLD AUTO: 0.11 K/UL (ref 0–0.51)
EOSINOPHIL NFR BLD: 2.4 % (ref 0–6.9)
ERYTHROCYTE [DISTWIDTH] IN BLOOD BY AUTOMATED COUNT: 44.5 FL (ref 35.9–50)
ERYTHROCYTE [DISTWIDTH] IN BLOOD BY AUTOMATED COUNT: 44.8 FL (ref 35.9–50)
FASTING STATUS PATIENT QL REPORTED: NORMAL
GFR SERPLBLD CREATININE-BSD FMLA CKD-EPI: 88 ML/MIN/1.73 M 2
GLOBULIN SER CALC-MCNC: 2.5 G/DL (ref 1.9–3.5)
GLUCOSE SERPL-MCNC: 102 MG/DL (ref 65–99)
HCT VFR BLD AUTO: 47.7 % (ref 42–52)
HCT VFR BLD AUTO: 48.2 % (ref 42–52)
HDLC SERPL-MCNC: 56 MG/DL
HGB BLD-MCNC: 16.3 G/DL (ref 14–18)
HGB BLD-MCNC: 16.6 G/DL (ref 14–18)
IMM GRANULOCYTES # BLD AUTO: 0 K/UL (ref 0–0.11)
IMM GRANULOCYTES NFR BLD AUTO: 0 % (ref 0–0.9)
LDLC SERPL CALC-MCNC: 103 MG/DL
LYMPHOCYTES # BLD AUTO: 1.08 K/UL (ref 1–4.8)
LYMPHOCYTES NFR BLD: 23.9 % (ref 22–41)
MCH RBC QN AUTO: 32 PG (ref 27–33)
MCH RBC QN AUTO: 32.3 PG (ref 27–33)
MCHC RBC AUTO-ENTMCNC: 33.8 G/DL (ref 32.3–36.5)
MCHC RBC AUTO-ENTMCNC: 34.8 G/DL (ref 32.3–36.5)
MCV RBC AUTO: 92.8 FL (ref 81.4–97.8)
MCV RBC AUTO: 94.5 FL (ref 81.4–97.8)
MICROALBUMIN UR-MCNC: <1.2 MG/DL
MICROALBUMIN/CREAT UR: NORMAL MG/G (ref 0–30)
MONOCYTES # BLD AUTO: 0.36 K/UL (ref 0–0.85)
MONOCYTES NFR BLD AUTO: 8 % (ref 0–13.4)
NEUTROPHILS # BLD AUTO: 2.92 K/UL (ref 1.82–7.42)
NEUTROPHILS NFR BLD: 64.6 % (ref 44–72)
NRBC # BLD AUTO: 0 K/UL
NRBC BLD-RTO: 0 /100 WBC (ref 0–0.2)
PLATELET # BLD AUTO: 188 K/UL (ref 164–446)
PLATELET # BLD AUTO: 189 K/UL (ref 164–446)
PMV BLD AUTO: 11.8 FL (ref 9–12.9)
PMV BLD AUTO: 11.9 FL (ref 9–12.9)
POTASSIUM SERPL-SCNC: 4.6 MMOL/L (ref 3.6–5.5)
PROT SERPL-MCNC: 6.8 G/DL (ref 6–8.2)
RBC # BLD AUTO: 5.1 M/UL (ref 4.7–6.1)
RBC # BLD AUTO: 5.14 M/UL (ref 4.7–6.1)
SODIUM SERPL-SCNC: 140 MMOL/L (ref 135–145)
TRIGL SERPL-MCNC: 84 MG/DL (ref 0–149)
TSH SERPL-ACNC: 2.06 UIU/ML (ref 0.38–5.33)
WBC # BLD AUTO: 4.5 K/UL (ref 4.8–10.8)
WBC # BLD AUTO: 4.7 K/UL (ref 4.8–10.8)

## 2025-04-11 PROCEDURE — 82550 ASSAY OF CK (CPK): CPT

## 2025-04-11 PROCEDURE — 82570 ASSAY OF URINE CREATININE: CPT

## 2025-04-11 PROCEDURE — 36415 COLL VENOUS BLD VENIPUNCTURE: CPT

## 2025-04-11 PROCEDURE — 80061 LIPID PANEL: CPT

## 2025-04-11 PROCEDURE — 85027 COMPLETE CBC AUTOMATED: CPT

## 2025-04-11 PROCEDURE — 83695 ASSAY OF LIPOPROTEIN(A): CPT

## 2025-04-11 PROCEDURE — 84153 ASSAY OF PSA TOTAL: CPT

## 2025-04-11 PROCEDURE — 85025 COMPLETE CBC W/AUTO DIFF WBC: CPT

## 2025-04-11 PROCEDURE — 82172 ASSAY OF APOLIPOPROTEIN: CPT

## 2025-04-11 PROCEDURE — 80053 COMPREHEN METABOLIC PANEL: CPT

## 2025-04-11 PROCEDURE — 82043 UR ALBUMIN QUANTITATIVE: CPT

## 2025-04-11 PROCEDURE — 84443 ASSAY THYROID STIM HORMONE: CPT

## 2025-04-12 LAB — PSA SERPL DL<=0.01 NG/ML-MCNC: 1.54 NG/ML (ref 0–4)

## 2025-04-13 LAB — APO B100 SERPL-MCNC: 78 MG/DL (ref 66–133)

## 2025-04-14 ENCOUNTER — RESULTS FOLLOW-UP (OUTPATIENT)
Dept: CARDIOLOGY | Facility: MEDICAL CENTER | Age: 60
End: 2025-04-14
Payer: COMMERCIAL

## 2025-04-14 LAB — LPA SERPL-MCNC: <6 MG/DL

## 2025-04-15 ENCOUNTER — OFFICE VISIT (OUTPATIENT)
Dept: VASCULAR LAB | Facility: MEDICAL CENTER | Age: 60
End: 2025-04-15
Attending: INTERNAL MEDICINE
Payer: COMMERCIAL

## 2025-04-15 VITALS
HEART RATE: 72 BPM | SYSTOLIC BLOOD PRESSURE: 128 MMHG | DIASTOLIC BLOOD PRESSURE: 86 MMHG | WEIGHT: 215 LBS | BODY MASS INDEX: 29.12 KG/M2 | HEIGHT: 72 IN

## 2025-04-15 DIAGNOSIS — Z79.01 CHRONIC ANTICOAGULATION: ICD-10-CM

## 2025-04-15 DIAGNOSIS — I10 ESSENTIAL HYPERTENSION, BENIGN: ICD-10-CM

## 2025-04-15 DIAGNOSIS — Z95.2 H/O MECHANICAL AORTIC VALVE REPLACEMENT: ICD-10-CM

## 2025-04-15 DIAGNOSIS — E78.5 DYSLIPIDEMIA: ICD-10-CM

## 2025-04-15 DIAGNOSIS — Z98.890 H/O ASCENDING AORTA REPAIR: ICD-10-CM

## 2025-04-15 PROCEDURE — 99212 OFFICE O/P EST SF 10 MIN: CPT

## 2025-04-15 PROCEDURE — 3079F DIAST BP 80-89 MM HG: CPT | Performed by: INTERNAL MEDICINE

## 2025-04-15 PROCEDURE — 99214 OFFICE O/P EST MOD 30 MIN: CPT | Performed by: INTERNAL MEDICINE

## 2025-04-15 PROCEDURE — 3074F SYST BP LT 130 MM HG: CPT | Performed by: INTERNAL MEDICINE

## 2025-04-15 ASSESSMENT — FIBROSIS 4 INDEX: FIB4 SCORE: 2.17

## 2025-04-15 NOTE — PROGRESS NOTES
VASCULAR MEDICINE CLINIC - Follow up VISIT  04/15/25    Kirna Raymond is a 59 y.o. male   Referring provider: Renato Joe M.D.     Subjective      HPI:   Here for f/u of ascending aneurysm repair and mechanical SAVR from 2008  No TIA or stroke symptoms.  He had muscle pain with rosuvastatin and his INR increased  He was unable to tolerate atorvastatin due to muscle pain  He was started on losartan at a low dose about 6 months ago when his blood pressures are going up.  He does not check very often at home but says when he does it is less than 130/80  He remains on aspirin and warfarin  No bleeding  He has a home INR monitor with [employer] and then is given instructions for dosing by his PCP.  He seems comfortable with this arrangement  Had episode of epistaxis with normal INR  Denies tobacco use or other stimulants    Social History     Tobacco Use    Smoking status: Never    Smokeless tobacco: Current   Vaping Use    Vaping status: Never Used   Substance Use Topics    Alcohol use: Yes     Comment: little bit of alcohol    Drug use: No     DIET AND EXERCISE:  Weight Change: stable  Diet: relatively healthy  Exercise: limited      Objective    Vitals:    04/15/25 1134 04/15/25 1138   BP: (!) 143/89 128/86   BP Location: Left arm Left arm   Patient Position: Sitting Sitting   BP Cuff Size: Adult Adult   Pulse: 73 72   Weight: 97.5 kg (215 lb)    Height: 1.829 m (6')       BP Readings from Last 4 Encounters:   04/15/25 128/86   12/12/24 (!) 155/80   12/05/24 122/74   08/22/24 (!) 148/76      Body mass index is 29.16 kg/m².   Wt Readings from Last 4 Encounters:   04/15/25 97.5 kg (215 lb)   12/12/24 93 kg (205 lb)   12/05/24 93.9 kg (207 lb)   08/22/24 89.8 kg (198 lb)      Physical Exam  Vitals reviewed.   Constitutional:       General: He is not in acute distress.     Appearance: He is not diaphoretic.   HENT:      Head: Normocephalic and atraumatic.   Eyes:      General: No scleral icterus.      "Conjunctiva/sclera: Conjunctivae normal.   Neck:      Vascular: No carotid bruit.   Cardiovascular:      Rate and Rhythm: Normal rate and regular rhythm.      Heart sounds: Murmur heard.   Pulmonary:      Effort: Pulmonary effort is normal. No respiratory distress.      Breath sounds: Normal breath sounds. No wheezing or rales.   Musculoskeletal:      Right lower leg: No edema.      Left lower leg: No edema.   Skin:     Coloration: Skin is not pale.   Neurological:      General: No focal deficit present.      Mental Status: He is alert and oriented to person, place, and time.      Cranial Nerves: No cranial nerve deficit.      Coordination: Coordination normal.      Gait: Gait is intact. Gait normal.   Psychiatric:         Mood and Affect: Mood and affect normal.         Behavior: Behavior normal.          DATA REVIEW    Lab Results   Component Value Date/Time    CHOLSTRLTOT 176 04/11/2025 09:38 AM     (H) 04/11/2025 09:38 AM    HDL 56 04/11/2025 09:38 AM    TRIGLYCERIDE 84 04/11/2025 09:38 AM       Lab Results   Component Value Date/Time     (H) 04/11/2025 09:38 AM     (H) 12/03/2024 08:32 AM     (H) 04/26/2024 08:34 AM     (H) 05/12/2023 09:51 AM     Lab Results   Component Value Date/Time    LIPOPROTA <6 04/11/2025 09:38 AM     Lab Results   Component Value Date/Time    APOB 78 04/11/2025 09:38 AM     Lab Results   Component Value Date/Time    SODIUM 140 04/11/2025 09:38 AM    POTASSIUM 4.6 04/11/2025 09:38 AM    CHLORIDE 106 04/11/2025 09:38 AM    CO2 25 04/11/2025 09:38 AM    GLUCOSE 102 (H) 04/11/2025 09:38 AM    BUN 15 04/11/2025 09:38 AM    CREATININE 0.97 04/11/2025 09:38 AM    CREATININE 1.0 01/02/2009 07:50 PM     Lab Results   Component Value Date/Time    ALKPHOSPHAT 59 04/11/2025 09:38 AM    ASTSGOT 30 04/11/2025 09:38 AM    ALTSGPT 20 04/11/2025 09:38 AM    TBILIRUBIN 0.8 04/11/2025 09:38 AM       No results found for: \"HBA1C\"    Lab Results   Component Value " Date/Time    MALBCRT see below 04/11/2025 09:38 AM    MICROALBUR <1.2 04/11/2025 09:38 AM         Cardiovascular Imaging:    Echo aug 2024  Normal left ventricular chamber size.  The ejection fraction is measured to be 61 % by Rodriguez's biplane.  Known mechanical aortic valve that is functioning normally with normal   transvalvular gradients.  Normal inferior vena cava size and inspiratory collapse.    Previous Cardiovascular Procedures of Note:    2008 - mechanical SAVR and ascending repair        Medical Decision Making:  Today's Assessment / Status / Plan:     1. H/O mechanical aortic valve replacement        2. Dyslipidemia  Comp Metabolic Panel    APOLIPOPROTEIN B    Lipid Profile      3. Chronic anticoagulation        4. H/O ascending aorta repair        5. Essential hypertension, benign  CBC WITHOUT DIFFERENTIAL    Comp Metabolic Panel         Etiology of Established CVD if Present:     1) valvular heart disease and ascending arctic aneurysm status post mechanical SAVR and ascending repair in 2008 -no obvious evidence of connective tissue disease.  No family members affected.  Valve was tricuspid at surgery.  We discussed the importance of family screening and surveillance.  Valve appears to be functioning well on recent echocardiogram  Plan:  -Recommended echocardiogram every 3 to 5 years for first-degree relatives pending results of genetic testing  -Send for [facility] aortopathy panel  -we have not yet seen results.  Have asked MA to follow-up  - Again recommended CTA chest to rule out aneurysmal change distal to the repair -patient structured to call scheduling  -Repeat echocardiogram at the discretion of cardiology, but does not need to be done more than every few years in my opinion  -Medical management and lifestyle modification as per below    LIPID MANAGEMENT  Qualifies for Statin Therapy Based on Most Recent ACC/AHA Guidelines: yes,   10-yr ASCVD risk score: The 10-year ASCVD risk score (Aura EVANS, et  "al., 2019) is: 9%, 7.5 - <20% \"intermediate risk\"   Major ASCVD events: None    High-risk conditions: None   Risk-enhancers: favorable lp(a)  Currently on Statin: no  Had significant myalgias and difficulty maintaining INR on rosuvastatin  Had significant myalgias on Atorvastatin  Tx goals:   LDL less than 100  Apolipoprotein B less than 90  At goal?  LDL a bit above goal and a ApoB below goal  Plan:   -Hold lipid-lowering medications for now  -Recheck fasting lipid panel, apolipoprotein B, prior to next visit  - Consider nonstatin medication depending upon result    BLOOD PRESSURE MANAGEMENT  BP Goal: ACC/AHA goal <130/80  Home BP at goal: Yes  Office BP at goal:  no  24h ABPM:  not ordered to date   Contributing factors: Mild LVH on echo  Modest alcohol use   GFR and electrolytes stable  No albuminuria in 2025  Plan:   Monitoring:   -Restart careful home blood pressure monitoring and bring in a log  - Consider ABPM if continued evidence of whitecoat hypertension  Medications:  -Continue losartan 25 mg a day for now but would have low threshold to increase dose    GLYCEMIC MANAGEMENT -prediabetes  Fasting glucose consistent with mild IFG  - Continue lifestyle modification alone for now  - Recheck fasting glucose and consider A1c in the future    ANTITHROMBOTIC THERAPY:   Discussed risk and benefits of anticoagulation/antithrombotic therapy in this setting.  Although the combination of warfarin and aspirin can increase bleeding risk he is overall at low bleeding risk and I do think this combination is warranted to prevent stroke  -Continue warfarin with INR goal 2-3  -Continue low-dose aspirin  -Report any bleeding  -He can continue to use [employer] and follow-up with his PCP for dosing since that seems to be working well for him, but I did offer our services in our anticoagulation clinic if he or his PCP is interested in the future  - If continued epistaxis would consider referral to ENT and/or perhaps consideration " of discontinuation of aspirin    LIFESTYLE INTERVENTIONS:    Tobacco Use: Never  - continued complete avoidance of all tobacco products     Physical Activity: Encouraged more regular exercise    Weight Management and/or Nutrition: Limit alcohol to no more than 2 drinks a day.  Continue overall heart healthy eating plan and maintenance of weight    Studies to Be Obtained: CTA chest  Labs to Be Obtained: As above    Follow up in: 6 months -likely October send.  Needs appointment.  In the future may not need to see both cardiology and vascular medicine.  Can likely drop 1 of us after next visit    Michael J Bloch, M.D.   [facility]  [facility]  [phone]    Cc:   ESTUARDO Kiser

## 2025-04-29 ENCOUNTER — TELEPHONE (OUTPATIENT)
Dept: VASCULAR LAB | Facility: MEDICAL CENTER | Age: 60
End: 2025-04-29
Payer: COMMERCIAL

## 2025-04-29 NOTE — TELEPHONE ENCOUNTER
Called [facility] ([phone])  [facility]anice stated that test codes were unclear, they sent an email to get clarification to [email or web address] email. Which was invalid. So we never responded.      Requested to update our email with [email or web address] and to call our Vascular office for further sample issues.    Sample was discarded.      Violet Mae, Med Ass't  [facility]  Ph. [phone]  Fx. [phone]

## 2025-04-29 NOTE — TELEPHONE ENCOUNTER
----- Message from Medical Assistant Ike Schmitt Ass't sent at 4/29/2025  8:53 AM PDT -----  Regarding: FW: invitae sample    ----- Message -----  From: Michael J Bloch, M.D.  Sent: 4/15/2025  11:55 AM PDT  To: Ike Madison Ass't  Subject: invitae sample                                   Can you follow up on invitae sample from dec.  He sent it in but we never got a result    mb

## 2025-05-29 ENCOUNTER — TELEPHONE (OUTPATIENT)
Dept: VASCULAR LAB | Facility: MEDICAL CENTER | Age: 60
End: 2025-05-29
Payer: COMMERCIAL

## 2025-05-29 NOTE — TELEPHONE ENCOUNTER
Called and left message.  Letting patient know to keep Cardiology appt and to discuss ongoing care with cardiology is needed.      Violet Mae, Med Ass't  [facility]  Ph. [phone]  Fx. [phone]

## 2025-05-29 NOTE — TELEPHONE ENCOUNTER
----- Message from Physician Michael Bloch, M.D. sent at 5/23/2025  3:36 PM PDT -----  Regarding: RE: 10-7 - needs appt bloch  I would have him go to that cards visit. He can ask them whether they need to see him back at that visitMb  ----- Message -----  From: Violet Mae, Med Ass't  Sent: 5/23/2025  11:40 AM PDT  To: Michael J Bloch, M.D.  Subject: RE: 10-7 - needs appt bloch                      Scheduled in Oct 2025Patient is wondering if he needs to keeps Cards appt 06/24/25. According to your note:Follow up in: 6 months -likely October send.  Needs appointment.  In the future may not need to see both cardiology and vascular medicine.  Can likely drop 1 of us after next visit  ----- Message -----  From: Michael J Bloch, M.D.  Sent: 5/22/2025   1:29 PM PDT  To: Michael J Bloch, M.D.; [facility]  Subject: RE: 10-7 - needs appt bloch                      [facility] schedule appt with me on 10[facility]7 at [facility]  ----- Message -----  From: Michael J Bloch, M.D.  Sent: 4/15/2025   5:12 PM PDT  To: Michael J Bloch, M.D.  Subject: 10-7 - needs appt bloch

## 2025-06-24 ENCOUNTER — OFFICE VISIT (OUTPATIENT)
Facility: MEDICAL CENTER | Age: 60
End: 2025-06-24
Attending: INTERNAL MEDICINE
Payer: COMMERCIAL

## 2025-06-24 VITALS
HEART RATE: 80 BPM | BODY MASS INDEX: 29.26 KG/M2 | WEIGHT: 216 LBS | RESPIRATION RATE: 16 BRPM | SYSTOLIC BLOOD PRESSURE: 114 MMHG | OXYGEN SATURATION: 98 % | DIASTOLIC BLOOD PRESSURE: 72 MMHG | HEIGHT: 72 IN

## 2025-06-24 DIAGNOSIS — I10 ESSENTIAL HYPERTENSION, BENIGN: ICD-10-CM

## 2025-06-24 DIAGNOSIS — Z79.01 CHRONIC ANTICOAGULATION: ICD-10-CM

## 2025-06-24 DIAGNOSIS — Z98.890 H/O ASCENDING AORTA REPAIR: ICD-10-CM

## 2025-06-24 DIAGNOSIS — E78.5 DYSLIPIDEMIA: ICD-10-CM

## 2025-06-24 DIAGNOSIS — Z95.2 H/O MECHANICAL AORTIC VALVE REPLACEMENT: Primary | ICD-10-CM

## 2025-06-24 PROCEDURE — 99212 OFFICE O/P EST SF 10 MIN: CPT | Performed by: INTERNAL MEDICINE

## 2025-06-24 PROCEDURE — 3078F DIAST BP <80 MM HG: CPT | Performed by: INTERNAL MEDICINE

## 2025-06-24 PROCEDURE — 99214 OFFICE O/P EST MOD 30 MIN: CPT | Performed by: INTERNAL MEDICINE

## 2025-06-24 PROCEDURE — 3074F SYST BP LT 130 MM HG: CPT | Performed by: INTERNAL MEDICINE

## 2025-06-24 ASSESSMENT — ENCOUNTER SYMPTOMS
WEAKNESS: 0
PALPITATIONS: 0
GASTROINTESTINAL NEGATIVE: 1
DIZZINESS: 0
CHILLS: 0
MYALGIAS: 0
CARDIOVASCULAR NEGATIVE: 1
DOUBLE VISION: 0
HEADACHES: 0
ABDOMINAL PAIN: 0
VOMITING: 0
COUGH: 0
NERVOUS/ANXIOUS: 0
NEUROLOGICAL NEGATIVE: 1
CLAUDICATION: 0
FEVER: 0
PSYCHIATRIC NEGATIVE: 1
RESPIRATORY NEGATIVE: 1
FOCAL WEAKNESS: 0
CONSTITUTIONAL NEGATIVE: 1
SHORTNESS OF BREATH: 0
NAUSEA: 0
EYES NEGATIVE: 1
BLURRED VISION: 0
DEPRESSION: 0
BRUISES/BLEEDS EASILY: 0
WEIGHT LOSS: 0

## 2025-06-24 ASSESSMENT — FIBROSIS 4 INDEX: FIB4 SCORE: 2.2

## 2025-06-24 NOTE — PROGRESS NOTES
Chief Complaint   Patient presents with    Hypertension    Dyslipidemia       Subjective     Kiran Raymond is a 60 y.o. male who presents today for follow up of mechanical aortic valve replacement and root repair, hypertension and hyperlipidemia.    Since the patient's last visit on 12/05/25, he has been doing well clinically from cardiac standpoint. He admit to right knee pain which has limited his activities. He denies fatigue, chest pain, shortness of breath, palpitations, lower extremity edema, dizziness or syncope. He is planning to retire in a year and a half.     Past Medical History[1]  Past Surgical History[2]  Family History   Problem Relation Age of Onset    Heart Disease Neg Hx     Heart Attack Neg Hx      Social History     Socioeconomic History    Marital status: Single     Spouse name: Not on file    Number of children: Not on file    Years of education: Not on file    Highest education level: Not on file   Occupational History    Not on file   Tobacco Use    Smoking status: Never    Smokeless tobacco: Current   Vaping Use    Vaping status: Never Used   Substance and Sexual Activity    Alcohol use: Yes     Comment: little bit of alcohol    Drug use: No    Sexual activity: Not on file   Other Topics Concern    Not on file   Social History Narrative    Not on file     Social Drivers of Health     Financial Resource Strain: Not on file   Food Insecurity: Not on file   Transportation Needs: Not on file   Physical Activity: Not on file   Stress: Not on file   Social Connections: Not on file   Intimate Partner Violence: Not on file   Housing Stability: Not on file     Allergies[3]  Encounter Medications[4]  Review of Systems   Constitutional: Negative.  Negative for chills, fever, malaise/fatigue and weight loss.   HENT: Negative.  Negative for hearing loss.    Eyes: Negative.  Negative for blurred vision and double vision.   Respiratory: Negative.  Negative for cough and shortness of breath.     Cardiovascular: Negative.  Negative for chest pain, palpitations, claudication and leg swelling.   Gastrointestinal: Negative.  Negative for abdominal pain, nausea and vomiting.   Genitourinary: Negative.  Negative for dysuria and urgency.   Musculoskeletal:  Positive for joint pain. Negative for myalgias.   Skin: Negative.  Negative for itching and rash.   Neurological: Negative.  Negative for dizziness, focal weakness, weakness and headaches.   Endo/Heme/Allergies: Negative.  Does not bruise/bleed easily.   Psychiatric/Behavioral: Negative.  Negative for depression. The patient is not nervous/anxious.               Objective     /72 (BP Location: Left arm, Patient Position: Sitting, BP Cuff Size: Adult)   Pulse 80   Resp 16   Ht 1.829 m (6')   Wt 98 kg (216 lb)   SpO2 98%   BMI 29.29 kg/m²     Physical Exam  Constitutional:       Appearance: Normal appearance. He is well-developed and normal weight.   HENT:      Head: Normocephalic and atraumatic.   Neck:      Vascular: No JVD.   Cardiovascular:      Rate and Rhythm: Normal rate and regular rhythm.      Heart sounds: Normal heart sounds.   Pulmonary:      Effort: Pulmonary effort is normal.      Breath sounds: Normal breath sounds.   Abdominal:      General: Bowel sounds are normal.      Palpations: Abdomen is soft.      Comments: No hepatosplenomegaly.   Musculoskeletal:         General: Normal range of motion.   Lymphadenopathy:      Cervical: No cervical adenopathy.   Skin:     General: Skin is warm and dry.   Neurological:      Mental Status: He is alert and oriented to person, place, and time.            CARDIAC STUDIES/PROCEDURES:     ECHOCARDIOGRAM CONCLUSIONS (08/15/24)  Normal left ventricular chamber size.  The ejection fraction is measured to be 61% by Rodriguez's biplane.  Known mechanical aortic valve that is functioning normally with normal   transvalvular gradients.  Normal inferior vena cava size and inspiratory collapse.  Normal aortic  root for body surface area. The ascending aorta diameter is 3.1cm.     EKG performed on (08/22/24) EKG shows sinus rhythm .      Laboratory results of (12/03/24) Cholesterol profile of 180/81/61/103 mg/dL noted.  Laboratory results of (04/26/24) Cholesterol profile of 187/76/61/111 mg/dL noted.     Assessment & Plan     1. H/O mechanical aortic valve replacement        2. H/O ascending aorta repair        3. Chronic anticoagulation        4. Essential hypertension, benign        5. Dyslipidemia            Medical Decision Making: Today's Assessment/Status/Plan:        History of mechanical aortic valve replacement with aortic root replacement (25-mm [employer] valved conduit and coronary artery reimplantation by Verona Mejias 12/17/08): He is a 59 year old male with mechanical aortic valve replacement and root repair, hypertension and hyperlipidemia. He is clinically doing well from valvular standpoint. We will repeat an echocardiogram as requested in one year. (Managed with [facility])   Hypertension: Currently, the average blood pressure is well controlled. We will continue with losartan.  Hyperlipidemia: He is doing well on statin therapy without myalgia symptoms.  Additional information: He lives in [place], [place] and works as a supervisor for road crew for [employer].     \We will follow up the patient in 1 year.    CC King Parada                  [1]   Past Medical History:  Diagnosis Date    Hyperlipidemia     Valvular heart disease    [2]   Past Surgical History:  Procedure Laterality Date    PB KNEE SCOPE,MED/LAT MENISECTOMY Left 6/11/2024    Procedure: LEFT KNEE ARTHROSCOPY, LEFT PARTIAL MEDIAL MENISCECTOMY, REPAIRS AS NEEDED;  Surgeon: Stanley Lyon M.D.;  Location: [facility];  Service: Orthopedics    AORTIC VALVE REPLACEMENT  12/17/2008    Performed by VERONA VALIENTE at SURGERY [facility]   [3] No Known Allergies  [4]   Outpatient Encounter  Medications as of 6/24/2025   Medication Sig Dispense Refill    Cholecalciferol (VITAMIN D-3 PO) Take  by mouth.      losartan (COZAAR) 25 MG Tab Take 1 Tablet by mouth every day. 100 Tablet 3    aspirin 81 MG EC tablet Take 81 mg by mouth every day.      cetirizine (ZYRTEC) 10 MG Tab Take 10 mg by mouth every day.      warfarin (COUMADIN) 5 MG Tab Take 5 mg by mouth every day.      Multiple Vitamin (MULTI-VITAMIN) Tab Take 1 Tablet by mouth every day.       No facility-administered encounter medications on file as of 6/24/2025.